# Patient Record
Sex: FEMALE | Race: WHITE | URBAN - METROPOLITAN AREA
[De-identification: names, ages, dates, MRNs, and addresses within clinical notes are randomized per-mention and may not be internally consistent; named-entity substitution may affect disease eponyms.]

---

## 2017-01-09 ENCOUNTER — OUTPATIENT (OUTPATIENT)
Dept: OUTPATIENT SERVICES | Facility: HOSPITAL | Age: 82
LOS: 1 days | End: 2017-01-09

## 2017-01-09 ENCOUNTER — APPOINTMENT (OUTPATIENT)
Dept: ULTRASOUND IMAGING | Facility: HOSPITAL | Age: 82
End: 2017-01-09

## 2017-01-09 DIAGNOSIS — R74.0 NONSPECIFIC ELEVATION OF LEVELS OF TRANSAMINASE AND LACTIC ACID DEHYDROGENASE [LDH]: ICD-10-CM

## 2018-04-03 ENCOUNTER — INPATIENT (INPATIENT)
Facility: HOSPITAL | Age: 83
LOS: 0 days | DRG: 871 | End: 2018-04-04
Attending: SPECIALIST | Admitting: SPECIALIST
Payer: MEDICARE

## 2018-04-03 VITALS
OXYGEN SATURATION: 95 % | WEIGHT: 179.9 LBS | DIASTOLIC BLOOD PRESSURE: 82 MMHG | HEART RATE: 80 BPM | SYSTOLIC BLOOD PRESSURE: 132 MMHG | RESPIRATION RATE: 18 BRPM

## 2018-04-03 DIAGNOSIS — I48.91 UNSPECIFIED ATRIAL FIBRILLATION: ICD-10-CM

## 2018-04-03 LAB
BASOPHILS # BLD AUTO: 0 K/UL — SIGNIFICANT CHANGE UP (ref 0–0.2)
BASOPHILS NFR BLD AUTO: 0.1 % — SIGNIFICANT CHANGE UP (ref 0–2)
CK MB BLD-MCNC: 2.5 % — SIGNIFICANT CHANGE UP (ref 0–3.5)
CK MB CFR SERPL CALC: 4.9 NG/ML — HIGH (ref 0–3.8)
EOSINOPHIL # BLD AUTO: 0 K/UL — SIGNIFICANT CHANGE UP (ref 0–0.5)
EOSINOPHIL NFR BLD AUTO: 0 % — SIGNIFICANT CHANGE UP (ref 0–6)
GAS PNL BLDV: SIGNIFICANT CHANGE UP
GAS PNL BLDV: SIGNIFICANT CHANGE UP
HCT VFR BLD CALC: 47.9 % — HIGH (ref 34.5–45)
HGB BLD-MCNC: 15.7 G/DL — HIGH (ref 11.5–15.5)
LYMPHOCYTES # BLD AUTO: 0.8 K/UL — LOW (ref 1–3.3)
LYMPHOCYTES # BLD AUTO: 5.9 % — LOW (ref 13–44)
MCHC RBC-ENTMCNC: 32.4 PG — SIGNIFICANT CHANGE UP (ref 27–34)
MCHC RBC-ENTMCNC: 32.7 GM/DL — SIGNIFICANT CHANGE UP (ref 32–36)
MCV RBC AUTO: 99.2 FL — SIGNIFICANT CHANGE UP (ref 80–100)
MONOCYTES # BLD AUTO: 0.7 K/UL — SIGNIFICANT CHANGE UP (ref 0–0.9)
MONOCYTES NFR BLD AUTO: 5.1 % — SIGNIFICANT CHANGE UP (ref 2–14)
NEUTROPHILS # BLD AUTO: 11.8 K/UL — HIGH (ref 1.8–7.4)
NEUTROPHILS NFR BLD AUTO: 88.9 % — HIGH (ref 43–77)
NT-PROBNP SERPL-SCNC: HIGH PG/ML (ref 0–300)
PLATELET # BLD AUTO: 261 K/UL — SIGNIFICANT CHANGE UP (ref 150–400)
RBC # BLD: 4.83 M/UL — SIGNIFICANT CHANGE UP (ref 3.8–5.2)
RBC # FLD: 14.9 % — HIGH (ref 10.3–14.5)
TROPONIN T SERPL-MCNC: 0.04 NG/ML — SIGNIFICANT CHANGE UP (ref 0–0.06)
WBC # BLD: 13.3 K/UL — HIGH (ref 3.8–10.5)
WBC # FLD AUTO: 13.3 K/UL — HIGH (ref 3.8–10.5)

## 2018-04-03 PROCEDURE — 99291 CRITICAL CARE FIRST HOUR: CPT

## 2018-04-03 PROCEDURE — 93010 ELECTROCARDIOGRAM REPORT: CPT

## 2018-04-03 PROCEDURE — 71045 X-RAY EXAM CHEST 1 VIEW: CPT | Mod: 26

## 2018-04-03 PROCEDURE — 99233 SBSQ HOSP IP/OBS HIGH 50: CPT | Mod: GC

## 2018-04-03 RX ORDER — SODIUM CHLORIDE 9 MG/ML
1000 INJECTION, SOLUTION INTRAVENOUS ONCE
Qty: 0 | Refills: 0 | Status: COMPLETED | OUTPATIENT
Start: 2018-04-03 | End: 2018-04-03

## 2018-04-03 RX ORDER — VANCOMYCIN HCL 1 G
1000 VIAL (EA) INTRAVENOUS ONCE
Qty: 0 | Refills: 0 | Status: COMPLETED | OUTPATIENT
Start: 2018-04-03 | End: 2018-04-03

## 2018-04-03 RX ORDER — PIPERACILLIN AND TAZOBACTAM 4; .5 G/20ML; G/20ML
3.38 INJECTION, POWDER, LYOPHILIZED, FOR SOLUTION INTRAVENOUS ONCE
Qty: 0 | Refills: 0 | Status: COMPLETED | OUTPATIENT
Start: 2018-04-03 | End: 2018-04-03

## 2018-04-03 RX ORDER — AMIODARONE HYDROCHLORIDE 400 MG/1
150 TABLET ORAL ONCE
Qty: 0 | Refills: 0 | Status: COMPLETED | OUTPATIENT
Start: 2018-04-03 | End: 2018-04-03

## 2018-04-03 RX ORDER — AMIODARONE HYDROCHLORIDE 400 MG/1
1 TABLET ORAL
Qty: 900 | Refills: 0 | Status: DISCONTINUED | OUTPATIENT
Start: 2018-04-03 | End: 2018-04-04

## 2018-04-03 RX ORDER — PHENYLEPHRINE HYDROCHLORIDE 10 MG/ML
1 INJECTION INTRAVENOUS
Qty: 80 | Refills: 0 | Status: DISCONTINUED | OUTPATIENT
Start: 2018-04-03 | End: 2018-04-04

## 2018-04-03 RX ORDER — SODIUM CHLORIDE 9 MG/ML
500 INJECTION, SOLUTION INTRAVENOUS ONCE
Qty: 0 | Refills: 0 | Status: COMPLETED | OUTPATIENT
Start: 2018-04-03 | End: 2018-04-03

## 2018-04-03 RX ADMIN — Medication 250 MILLIGRAM(S): at 22:30

## 2018-04-03 RX ADMIN — PIPERACILLIN AND TAZOBACTAM 200 GRAM(S): 4; .5 INJECTION, POWDER, LYOPHILIZED, FOR SOLUTION INTRAVENOUS at 21:30

## 2018-04-03 RX ADMIN — SODIUM CHLORIDE 2000 MILLILITER(S): 9 INJECTION, SOLUTION INTRAVENOUS at 19:45

## 2018-04-03 RX ADMIN — AMIODARONE HYDROCHLORIDE 33.33 MG/MIN: 400 TABLET ORAL at 22:14

## 2018-04-03 RX ADMIN — SODIUM CHLORIDE 1000 MILLILITER(S): 9 INJECTION, SOLUTION INTRAVENOUS at 20:45

## 2018-04-03 RX ADMIN — SODIUM CHLORIDE 4000 MILLILITER(S): 9 INJECTION, SOLUTION INTRAVENOUS at 19:45

## 2018-04-03 RX ADMIN — AMIODARONE HYDROCHLORIDE 600 MILLIGRAM(S): 400 TABLET ORAL at 21:20

## 2018-04-03 NOTE — ED PROVIDER NOTE - PMH
Cardiac disease    DM (diabetes mellitus)    HTN (hypertension)    PVC (premature ventricular contraction)

## 2018-04-03 NOTE — ED ADULT NURSE REASSESSMENT NOTE - NS ED NURSE REASSESS COMMENT FT1
Attempted to place hooker catheter, patient able to tolerate, and started to desaturate with positioning. MD made aware. MICU RN made aware Attempted to place hooker catheter, patient able to tolerate, and started to desaturate with positioning. MD made aware. MICU RN made aware. Awaiting phenylephrine infusion from pharmacy

## 2018-04-03 NOTE — ED PROVIDER NOTE - ATTENDING CONTRIBUTION TO CARE
Attending MD Bruce:  I personally have seen and examined this patient.  Resident note reviewed and agree on plan of care and except where noted.  See HPI, PE, and MDM for details.     Attending MD Bruce: A & O x 3, NAD, EOMI b/l, PERRL b/l; lungs CTAB, heart irreg rhythm without murmur; abdomen soft NTND; +extensive intertrigo of lower abdomen and b/l inguinal creases, extremities with no edema; affect appropriate. neuro exam non focal with no motor or sensory deficits.       87F presenting from home with report of generalized fatigue, vitals notable for rapid afib to 170s, relative hypotension to 80s-90s, pt mentating well at this time so will address with IV fluid resuscitation, infectious/metabolic work up. Rectal temp to r/o evolving sepsis. Attending MD Bruce:  I personally have seen and examined this patient.  Resident note reviewed and agree on plan of care and except where noted.  See HPI, PE, and MDM for details.     Attending MD Bruce: A & O x 3, NAD, EOMI b/l, PERRL b/l; lungs CTAB, heart irreg rhythm without murmur; abdomen soft NTND; +extensive intertrigo of lower abdomen and b/l inguinal creases, extremities with no edema; extremities cool, +distal mottling, affect appropriate. neuro exam non focal with no motor or sensory deficits.       87F presenting from home with report of generalized fatigue, vitals notable for rapid afib to 170s, relative hypotension to 80s-90s, pt mentating well at this time so will address with IV fluid resuscitation, infectious/metabolic work up. Rectal temp to r/o evolving sepsis.

## 2018-04-03 NOTE — ED PROVIDER NOTE - CARE PLAN
Principal Discharge DX:	Rapid atrial fibrillation  Secondary Diagnosis:	Lactic acidosis Principal Discharge DX:	Rapid atrial fibrillation  Secondary Diagnosis:	Lactic acidosis  Secondary Diagnosis:	Shock

## 2018-04-03 NOTE — ED PROVIDER NOTE - OBJECTIVE STATEMENT
87F with PMH of HTN, DM, frequent PVCs, presenting with increased weakness, decreased appetite x4 days. As per family bedside, patient was found on Saturday with most of her body slid off of her recliner chair for an unknown period of time. Patient states that she was napping and slid off the chair. Patient has been unable to pick herself up out of bed and has been walking less due to feeling weak. States she walks with a walker at baseline. Denies fever, chills, cp, sob, n/v/d, dizziness, headache, recent illness.  PMD: Dr. Brando Hutson

## 2018-04-03 NOTE — ED ADULT NURSE REASSESSMENT NOTE - NS ED NURSE REASSESS COMMENT FT1
Received report from [place] [facility]MS in [facility]. Patient moved to critical room C. Patient A&Ox3, Received report from [place] [facility]MS in [facility]. Patient moved to critical room C. Patient A&Ox3 with family at bedside. Patient tachycardic to 150s, denies palpitations or CP. Denies SOB, on 4L NC.  Patient reporting chronic lower back pain - repositioned in bed in position of comfort. Amiodarone infusing as ordered. Patient receiving vancomycin and 500cc LR bolus. B/L LE 3+ pitting edema noted, pulses strong and regular. Skin cool and mottled peripherally with dusky toes and fingertips. Erythema and excoriation noted to groin and rectum.  Md Bruce at bedside for evaluation

## 2018-04-03 NOTE — ED PROVIDER NOTE - PROGRESS NOTE DETAILS
Attending MD Bruce: IV fluids infusing, lactate elevated at 8, ddx includes sepsis or EUGENIA. Will continue the IV fluids, awaiting CMP, will need MICU consult Attending MD Bruce: SBP in 80s, will not pursue any further IV fluids as she has already had an adequate fluid challenge. Will continue amio and start peripheral ran to support BP for now in hopes that amio will lead to improved HR or cardioversion Attending MD Bruce: SBP in 80s, will not pursue any further IV fluids as she has already had an adequate fluid challenge. Will continue amio and start peripheral ran to support BP for now in hopes that amio will lead to improved HR or cardioversion. Patient endorsed to Dr. San of ICU, patient care to be assumed by this practitioner at this time.

## 2018-04-03 NOTE — CHART NOTE - NSCHARTNOTEFT_GEN_A_CORE
CHIEF COMPLAINT: weakness     HPI:  87F h/o HTN, T2DM, frequent PVCs, presenting with increased weakness, decreased appetite x4 days. Patient has been unable to pick herself up out of bed and has been walking less due to feeling weak. States she walks with a walker at baseline. Denies fever, chills, cp, sob, cough, n/v/d, dizziness, headache, dysuria, sick contacts, recent travel. Per family, patient has started to skip meals and require more supervision at home. However, has remained AAOx3. No new neuro defecits, change in stength/sensation.    PMD: Dr. Brando Hutson    PAST MEDICAL & SURGICAL HISTORY:  DM (diabetes mellitus)  PVC (premature ventricular contraction)  HTN (hypertension)  Cardiac disease  -Kidney stone removal (lithotripsy)      FAMILY HISTORY: non-contributory     Social: no IVDU, tobacco, or ETOH    Allergies: NKDA    HOME MEDICATIONS:  Januvia 100 mgx1  metformin 2000 mg at dinner   duloexitine 60 mg x1   klor-con 10 meqx1  olmesartan-hctz 40/25   atorvastatin 40     HOSPITAL MEDICATIONS:    piperacillin/tazobactam IVPB. 3.375 Gram(s) IV Intermittent once  vancomycin  IVPB 1000 milliGRAM(s) IV Intermittent once    amiodarone Infusion 1 mG/Min IV Continuous <Continuous>    REVIEW OF SYSTEMS:  Constitutional: [ ] negative [ ] fevers [ ] chills [ ] weight loss [ ] weight gain [x] weakness  HEENT: [x ] negative [ ] dry eyes [ ] eye irritation [ ] postnasal drip [ ] nasal congestion  CV: [x ] negative  [ ] chest pain [ ] orthopnea [ ] palpitations [ ] murmur  Resp: [x ] negative [ ] cough [ ] shortness of breath [ ] dyspnea [ ] wheezing [ ] sputum [ ] hemoptysis  GI: [x ] negative [ ] nausea [ ] vomiting [ ] diarrhea [ ] constipation [ ] abd pain [ ] dysphagia   : [x ] negative [ ] dysuria [ ] nocturia [ ] hematuria [ ] increased urinary frequency  Musculoskeletal: [x ] negative [ ] back pain [ ] myalgias [ ] arthralgias [ ] fracture  Skin: [x ] negative [ ] rash [ ] itch  Neurological: [x ] negative [ ] headache [ ] dizziness [ ] syncope [ ] weakness [ ] numbness  Psychiatric: [ ] negative [ ] anxiety [x ] depression  [ ] All other systems negative  [ ] Unable to assess ROS because ________    OBJECTIVE:  ICU Vital Signs Last 24 Hrs  T(C): 36.3 (03 Apr 2018 20:10), Max: 36.6 (03 Apr 2018 18:57)  T(F): 97.3 (03 Apr 2018 20:10), Max: 97.8 (03 Apr 2018 18:57)  HR: 154 (03 Apr 2018 21:03) (80 - 156)  BP: 111/67 (03 Apr 2018 21:03) (98/73 - 132/82)  BP(mean): --  ABP: --  ABP(mean): --  RR: 24 (03 Apr 2018 21:03) (18 - 24)  SpO2: 99% (03 Apr 2018 21:03) (95% - 99%)      PHYSICAL EXAM:  General: NAD, AAOx3  HEENT: MMM, EOMI, PERRL, sclera anicteric   Neck: supple  Respiratory: dec lung sounds on right   Cardiovascular:  irregularly irregular   Abdomen: soft, NT/ND,   Extremities:  +2 LE edema b/l   Skin: b/l skin erythema inner thigh skin folds   Neurological: non-focal  Psychiatry: nl mood and affect      LABS:                        15.7   13.3  )-----------( 261      ( 03 Apr 2018 20:11 )             47.9     Hgb Trend: 15.7<--  04-03    141  |  95<L>  |  68<H>  ----------------------------<  109<H>  4.3   |  19<L>  |  1.62<H>    Ca    9.9      03 Apr 2018 20:20    TPro  8.5<H>  /  Alb  4.0  /  TBili  1.3<H>  /  DBili  x   /  AST  40  /  ALT  28  /  AlkPhos  176<H>  04-03    Creatinine Trend: 1.62<--        Venous Blood Gas:  04-03 @ 20:11  7.23/53/24/21/22  VBG Lactate: 8.5      MICROBIOLOGY: blood cultures sent, UA pending     RADIOLOGY: CXR with RLL effusion     EKG: afib w/ rvr CHIEF COMPLAINT: weakness     HPI:  87F h/o HTN, T2DM, frequent PVCs, presenting with increased weakness, decreased appetite x4 days. Patient has been unable to pick herself up out of bed and has been walking less due to feeling weak. States she walks with a walker at baseline. Denies fever, chills, cp, sob, cough, n/v/d, dizziness, headache, dysuria, sick contacts, recent travel. Per family, patient has started to skip meals and require more supervision at home. However, has remained AAOx3. No new neuro defecits, change in stength/sensation.    Vital Signs Last 24 Hrs  T(C): 36.3 (04-03-18 @ 20:10), Max: 36.6 (04-03-18 @ 18:57)  T(F): 97.3 (04-03-18 @ 20:10), Max: 97.8 (04-03-18 @ 18:57)  HR: 154 (04-03-18 @ 21:03) (80 - 156)  BP: 111/67 (04-03-18 @ 21:03) (98/73 - 132/82)  BP(mean): --  RR: 24 (04-03-18 @ 21:03) (18 - 24)  SpO2: 99% (04-03-18 @ 21:03) (95% - 99%)    In ED, given 2.5 L LR. Started amio load.    PMD: Dr. Brando Hutson    PAST MEDICAL & SURGICAL HISTORY:  DM (diabetes mellitus)  PVC (premature ventricular contraction)  HTN (hypertension)  Cardiac disease  -Kidney stone removal (lithotripsy)      FAMILY HISTORY: non-contributory     Social: no IVDU, tobacco, or ETOH    Allergies: NKDA    HOME MEDICATIONS:  Januvia 100 mgx1  metformin 2000 mg at dinner   duloexitine 60 mg x1   klor-con 10 meqx1  olmesartan-hctz 40/25   atorvastatin 40     HOSPITAL MEDICATIONS:    piperacillin/tazobactam IVPB. 3.375 Gram(s) IV Intermittent once  vancomycin  IVPB 1000 milliGRAM(s) IV Intermittent once    amiodarone Infusion 1 mG/Min IV Continuous <Continuous>    REVIEW OF SYSTEMS:  Constitutional: [ ] negative [ ] fevers [ ] chills [ ] weight loss [ ] weight gain [x] weakness  HEENT: [x ] negative [ ] dry eyes [ ] eye irritation [ ] postnasal drip [ ] nasal congestion  CV: [x ] negative  [ ] chest pain [ ] orthopnea [ ] palpitations [ ] murmur  Resp: [x ] negative [ ] cough [ ] shortness of breath [ ] dyspnea [ ] wheezing [ ] sputum [ ] hemoptysis  GI: [x ] negative [ ] nausea [ ] vomiting [ ] diarrhea [ ] constipation [ ] abd pain [ ] dysphagia   : [x ] negative [ ] dysuria [ ] nocturia [ ] hematuria [ ] increased urinary frequency  Musculoskeletal: [x ] negative [ ] back pain [ ] myalgias [ ] arthralgias [ ] fracture  Skin: [x ] negative [ ] rash [ ] itch  Neurological: [x ] negative [ ] headache [ ] dizziness [ ] syncope [ ] weakness [ ] numbness  Psychiatric: [ ] negative [ ] anxiety [x ] depression  [ ] All other systems negative  [ ] Unable to assess ROS because ________    OBJECTIVE:  ICU Vital Signs Last 24 Hrs  T(C): 36.3 (03 Apr 2018 20:10), Max: 36.6 (03 Apr 2018 18:57)  T(F): 97.3 (03 Apr 2018 20:10), Max: 97.8 (03 Apr 2018 18:57)  HR: 154 (03 Apr 2018 21:03) (80 - 156)  BP: 111/67 (03 Apr 2018 21:03) (98/73 - 132/82)  BP(mean): --  ABP: --  ABP(mean): --  RR: 24 (03 Apr 2018 21:03) (18 - 24)  SpO2: 99% (03 Apr 2018 21:03) (95% - 99%)      PHYSICAL EXAM:  General: NAD, AAOx3  HEENT: MMM, EOMI, PERRL, sclera anicteric   Neck: supple  Respiratory: dec lung sounds on right   Cardiovascular:  irregularly irregular   Abdomen: soft, NT/ND,   Extremities:  +2 LE edema b/l   Skin: b/l skin erythema inner thigh skin folds   Neurological: non-focal  Psychiatry: nl mood and affect      LABS:                        15.7   13.3  )-----------( 261      ( 03 Apr 2018 20:11 )             47.9     Hgb Trend: 15.7<--  04-03    141  |  95<L>  |  68<H>  ----------------------------<  109<H>  4.3   |  19<L>  |  1.62<H>    Ca    9.9      03 Apr 2018 20:20    TPro  8.5<H>  /  Alb  4.0  /  TBili  1.3<H>  /  DBili  x   /  AST  40  /  ALT  28  /  AlkPhos  176<H>  04-03    Creatinine Trend: 1.62<--        Venous Blood Gas:  04-03 @ 20:11  7.23/53/24/21/22  VBG Lactate: 8.5    BNP-[id number]       MICROBIOLOGY: blood cultures sent, UA pending     RADIOLOGY: CXR with RLL effusion     EKG: afib w/ rvr    87F h/o HTN, T2DM, frequent PVCs, presenting with increased weakness, decreased appetite x4 days found to be in afib with rvr in setting of sepsis 2/2 possible RLL pna vs inner thigh cellulitis with UA pending:     #sepsis- hemodynamcially stable   -pan-culture- check RVP, UA/urine culture, f/u blood culture   -c/w vanc, zosyn     #afib w/ rvr likely 2/2 underlying infection   - c/w amio load, cards c/s (in setting of h/o PVCs), check TTE, TFTs, monitor on tele      #metabolic anion gap acidosis   -s/p 2.5L LR, c/w LR IVF resucitation   -repeat lactate and trend     #DEANNA w/ unknown baseline renal function   -likely prerenal in setting dec PO intake, vs ATN in setting of sepsis   -c/w IVF resuscitation   -trend Cr, renally dose meds     #T2DM   -SSI and hold metformin     #HTN  -hold meds in setting of sepsis     #dispo   -admit to [facility], GOC held with patient and family. Patient is DNR/DNI CHIEF COMPLAINT: weakness     HPI:  87F h/o HTN, T2DM, frequent PVCs, presenting with increased weakness, decreased appetite x4 days. Patient has been unable to pick herself up out of bed and has been walking less due to feeling weak. States she walks with a walker at baseline. Denies fever, chills, cp, sob, cough, n/v/d, dizziness, headache, dysuria, sick contacts, recent travel. Per family, patient has started to skip meals and require more supervision at home. However, has remained AAOx3. No new neuro defecits, change in stength/sensation.    Vital Signs Last 24 Hrs  T(C): 36.3 (04-03-18 @ 20:10), Max: 36.6 (04-03-18 @ 18:57)  T(F): 97.3 (04-03-18 @ 20:10), Max: 97.8 (04-03-18 @ 18:57)  HR: 154 (04-03-18 @ 21:03) (80 - 156)  BP: 111/67 (04-03-18 @ 21:03) (98/73 - 132/82)  BP(mean): --  RR: 24 (04-03-18 @ 21:03) (18 - 24)  SpO2: 99% (04-03-18 @ 21:03) (95% - 99%)    In ED, given 2.5 L LR. Started amio load.    PMD: Dr. Brando Hutson    PAST MEDICAL & SURGICAL HISTORY:  DM (diabetes mellitus)  PVC (premature ventricular contraction)  HTN (hypertension)  Cardiac disease  -Kidney stone removal (lithotripsy)      FAMILY HISTORY: non-contributory     Social: no IVDU, tobacco, or ETOH    Allergies: NKDA    HOME MEDICATIONS:  Januvia 100 mgx1  metformin 2000 mg at dinner   duloexitine 60 mg x1   klor-con 10 meqx1  olmesartan-hctz 40/25   atorvastatin 40     HOSPITAL MEDICATIONS:    piperacillin/tazobactam IVPB. 3.375 Gram(s) IV Intermittent once  vancomycin  IVPB 1000 milliGRAM(s) IV Intermittent once    amiodarone Infusion 1 mG/Min IV Continuous <Continuous>    REVIEW OF SYSTEMS:  Constitutional: [ ] negative [ ] fevers [ ] chills [ ] weight loss [ ] weight gain [x] weakness  HEENT: [x ] negative [ ] dry eyes [ ] eye irritation [ ] postnasal drip [ ] nasal congestion  CV: [x ] negative  [ ] chest pain [ ] orthopnea [ ] palpitations [ ] murmur  Resp: [x ] negative [ ] cough [ ] shortness of breath [ ] dyspnea [ ] wheezing [ ] sputum [ ] hemoptysis  GI: [x ] negative [ ] nausea [ ] vomiting [ ] diarrhea [ ] constipation [ ] abd pain [ ] dysphagia   : [x ] negative [ ] dysuria [ ] nocturia [ ] hematuria [ ] increased urinary frequency  Musculoskeletal: [x ] negative [ ] back pain [ ] myalgias [ ] arthralgias [ ] fracture  Skin: [x ] negative [ ] rash [ ] itch  Neurological: [x ] negative [ ] headache [ ] dizziness [ ] syncope [ ] weakness [ ] numbness  Psychiatric: [ ] negative [ ] anxiety [x ] depression  [ ] All other systems negative  [ ] Unable to assess ROS because ________    OBJECTIVE:  ICU Vital Signs Last 24 Hrs  T(C): 36.3 (03 Apr 2018 20:10), Max: 36.6 (03 Apr 2018 18:57)  T(F): 97.3 (03 Apr 2018 20:10), Max: 97.8 (03 Apr 2018 18:57)  HR: 154 (03 Apr 2018 21:03) (80 - 156)  BP: 111/67 (03 Apr 2018 21:03) (98/73 - 132/82)  BP(mean): --  ABP: --  ABP(mean): --  RR: 24 (03 Apr 2018 21:03) (18 - 24)  SpO2: 99% (03 Apr 2018 21:03) (95% - 99%)      PHYSICAL EXAM:  General: NAD, AAOx3  HEENT: MMM, EOMI, PERRL, sclera anicteric   Neck: supple  Respiratory: dec lung sounds on right   Cardiovascular:  irregularly irregular   Abdomen: soft, NT/ND,   Extremities:  +2 LE edema b/l   Skin: b/l skin erythema inner thigh skin folds   Neurological: non-focal  Psychiatry: nl mood and affect      LABS:                        15.7   13.3  )-----------( 261      ( 03 Apr 2018 20:11 )             47.9     Hgb Trend: 15.7<--  04-03    141  |  95<L>  |  68<H>  ----------------------------<  109<H>  4.3   |  19<L>  |  1.62<H>    Ca    9.9      03 Apr 2018 20:20    TPro  8.5<H>  /  Alb  4.0  /  TBili  1.3<H>  /  DBili  x   /  AST  40  /  ALT  28  /  AlkPhos  176<H>  04-03    Creatinine Trend: 1.62<--        Venous Blood Gas:  04-03 @ 20:11  7.23/53/24/21/22  VBG Lactate: 8.5    BNP-24392       MICROBIOLOGY: blood cultures sent, UA pending     RADIOLOGY: CXR with RLL effusion     EKG: afib w/ rvr    87F h/o HTN, T2DM, frequent PVCs, presenting with increased weakness, decreased appetite x4 days found to be in afib with rvr in setting of sepsis 2/2 possible RLL pna vs inner thigh cellulitis with UA pending:     #sepsis- hemodynamcially stable   -pan-culture- check RVP, UA/urine culture, f/u blood culture   -c/w vanc, zosyn     #afib w/ rvr likely 2/2 underlying infection   - c/w amio load, cards c/s (in setting of h/o PVCs), check TTE, TFTs, monitor on tele      #metabolic anion gap acidosis   -s/p 2.5L LR, c/w LR IVF resucitation   -repeat lactate and trend     #DEANNA w/ unknown baseline renal function   -likely prerenal in setting dec PO intake, vs ATN in setting of sepsis   -c/w IVF resuscitation   -trend Cr, renally dose meds     #T2DM   -SSI and hold metformin     #HTN  -hold meds in setting of sepsis     #elevated alk phos, total bili with no RUQ abn pain   -check RUQ US, trend labs     #dispo   -admit to [facility], GOC held with patient and family. Patient is DNR/DNI CHIEF COMPLAINT: weakness     HPI:  87F h/o HTN, T2DM, frequent PVCs, presenting with increased weakness, decreased appetite x4 days. Patient has been unable to pick herself up out of bed and has been walking less due to feeling weak. States she walks with a walker at baseline. Denies fever, chills, cp, sob, cough, n/v/d, dizziness, headache, dysuria, sick contacts, recent travel. No falls, LOC. Per family, patient has started to skip meals and require more supervision at home. However, has remained AAOx3. No new neuro defecits, change in stength/sensation.    Vital Signs Last 24 Hrs  T(C): 36.3 (04-03-18 @ 20:10), Max: 36.6 (04-03-18 @ 18:57)  T(F): 97.3 (04-03-18 @ 20:10), Max: 97.8 (04-03-18 @ 18:57)  HR: 154 (04-03-18 @ 21:03) (80 - 156)  BP: 111/67 (04-03-18 @ 21:03) (98/73 - 132/82)  BP(mean): --  RR: 24 (04-03-18 @ 21:03) (18 - 24)  SpO2: 99% (04-03-18 @ 21:03) (95% - 99%)    In ED, given 2.5 L LR, vanc, zosyn and started amio load.    PMD: Dr. Brando Hutson    PAST MEDICAL & SURGICAL HISTORY:  DM (diabetes mellitus)  PVC (premature ventricular contraction)  HTN (hypertension)  Cardiac disease  -Kidney stone removal (lithotripsy)      FAMILY HISTORY: non-contributory     Social: no IVDU, tobacco, or ETOH    Allergies: NKDA    HOME MEDICATIONS:  Januvia 100 mgx1  metformin 2000 mg at dinner   duloexitine 60 mg x1   klor-con 10 meqx1  olmesartan-hctz 40/25   atorvastatin 40     HOSPITAL MEDICATIONS:    piperacillin/tazobactam IVPB. 3.375 Gram(s) IV Intermittent once  vancomycin  IVPB 1000 milliGRAM(s) IV Intermittent once    amiodarone Infusion 1 mG/Min IV Continuous <Continuous>    REVIEW OF SYSTEMS:  Constitutional: [ ] negative [ ] fevers [ ] chills [ ] weight loss [ ] weight gain [x] weakness  HEENT: [x ] negative [ ] dry eyes [ ] eye irritation [ ] postnasal drip [ ] nasal congestion  CV: [x ] negative  [ ] chest pain [ ] orthopnea [ ] palpitations [ ] murmur  Resp: [x ] negative [ ] cough [ ] shortness of breath [ ] dyspnea [ ] wheezing [ ] sputum [ ] hemoptysis  GI: [x ] negative [ ] nausea [ ] vomiting [ ] diarrhea [ ] constipation [ ] abd pain [ ] dysphagia   : [x ] negative [ ] dysuria [ ] nocturia [ ] hematuria [ ] increased urinary frequency  Musculoskeletal: [x ] negative [ ] back pain [ ] myalgias [ ] arthralgias [ ] fracture  Skin: [x ] negative [ ] rash [ ] itch  Neurological: [x ] negative [ ] headache [ ] dizziness [ ] syncope [ ] weakness [ ] numbness  Psychiatric: [ ] negative [ ] anxiety [x ] depression  [ ] All other systems negative  [ ] Unable to assess ROS because ________    OBJECTIVE:  ICU Vital Signs Last 24 Hrs  T(C): 36.3 (03 Apr 2018 20:10), Max: 36.6 (03 Apr 2018 18:57)  T(F): 97.3 (03 Apr 2018 20:10), Max: 97.8 (03 Apr 2018 18:57)  HR: 154 (03 Apr 2018 21:03) (80 - 156)  BP: 111/67 (03 Apr 2018 21:03) (98/73 - 132/82)  BP(mean): --  ABP: --  ABP(mean): --  RR: 24 (03 Apr 2018 21:03) (18 - 24)  SpO2: 99% (03 Apr 2018 21:03) (95% - 99%)      PHYSICAL EXAM:  General: NAD, AAOx3  HEENT: MMM, EOMI, PERRL, sclera anicteric   Neck: supple  Respiratory: dec lung sounds on right   Cardiovascular:  irregularly irregular   Abdomen: soft, NT/ND,   Extremities:  +2 LE edema b/l   Skin: b/l skin erythema inner thigh skin folds   Neurological: non-focal  Psychiatry: nl mood and affect      LABS:                        15.7   13.3  )-----------( 261      ( 03 Apr 2018 20:11 )             47.9     Hgb Trend: 15.7<--  04-03    141  |  95<L>  |  68<H>  ----------------------------<  109<H>  4.3   |  19<L>  |  1.62<H>    Ca    9.9      03 Apr 2018 20:20    TPro  8.5<H>  /  Alb  4.0  /  TBili  1.3<H>  /  DBili  x   /  AST  40  /  ALT  28  /  AlkPhos  176<H>  04-03    Creatinine Trend: 1.62<--        Venous Blood Gas:  04-03 @ 20:11  7.23/53/24/21/22  VBG Lactate: 8.5    BNP-[id number]       MICROBIOLOGY: blood cultures sent, UA pending     RADIOLOGY: CXR with RLL effusion     EKG: afib w/ rvr    87F h/o HTN, T2DM, frequent PVCs, presenting with increased weakness, decreased appetite x4 days found to be in afib with rvr in setting of sepsis 2/2 possible RLL pna vs inner thigh cellulitis with UA pending:     #sepsis- hemodynamcially stable   -pan-culture- check RVP, UA/urine culture, f/u blood culture   -c/w vanc, zosyn     #afib w/ rvr likely 2/2 underlying infection   - c/w amio load, cards c/s (in setting of h/o PVCs), check TTE, TFTs, monitor on tele      #metabolic anion gap acidosis   -s/p 2.5L LR, c/w LR IVF resucitation   -repeat lactate and trend     #DEANNA w/ unknown baseline renal function   -likely prerenal in setting dec PO intake, vs ATN in setting of sepsis   -c/w IVF resuscitation   -trend Cr, renally dose meds     #T2DM   -SSI and hold metformin     #HTN  -hold meds in setting of sepsis     #elevated alk phos, total bili with no RUQ abn pain   -check RUQ US, trend labs     #dispo   -admit to [facility], GOC held with patient and family. Patient is DNR/DNI CHIEF COMPLAINT: weakness     HPI:  87F h/o HTN, T2DM, frequent PVCs, presenting with increased weakness, decreased appetite x4 days. Patient has been unable to pick herself up out of bed and has been walking less due to feeling weak. States she walks with a walker at baseline. Denies fever, chills, cp, sob, cough, n/v/d, dizziness, headache, dysuria, sick contacts, recent travel. No falls, LOC. Per family, patient has started to skip meals and require more supervision at home. However, has remained AAOx3. No new neuro defecits, change in stength/sensation.    Vital Signs Last 24 Hrs  T(C): 36.3 (04-03-18 @ 20:10), Max: 36.6 (04-03-18 @ 18:57)  T(F): 97.3 (04-03-18 @ 20:10), Max: 97.8 (04-03-18 @ 18:57)  HR: 154 (04-03-18 @ 21:03) (80 - 156)  BP: 111/67 (04-03-18 @ 21:03) (98/73 - 132/82)  BP(mean): --  RR: 24 (04-03-18 @ 21:03) (18 - 24)  SpO2: 99% (04-03-18 @ 21:03) (95% - 99%)    In ED, given 2.5 L LR, vanc, zosyn and started amio load.    PMD: Dr. Brando Hutson    PAST MEDICAL & SURGICAL HISTORY:  DM (diabetes mellitus)  PVC (premature ventricular contraction)  HTN (hypertension)  Cardiac disease  -Kidney stone removal (lithotripsy)      FAMILY HISTORY: non-contributory     Social: no IVDU, tobacco, or ETOH    Allergies: NKDA    HOME MEDICATIONS:  Januvia 100 mgx1  metformin 2000 mg at dinner   duloexitine 60 mg x1   klor-con 10 meqx1  olmesartan-hctz 40/25   atorvastatin 40     HOSPITAL MEDICATIONS:    piperacillin/tazobactam IVPB. 3.375 Gram(s) IV Intermittent once  vancomycin  IVPB 1000 milliGRAM(s) IV Intermittent once    amiodarone Infusion 1 mG/Min IV Continuous <Continuous>    REVIEW OF SYSTEMS:  Constitutional: [ ] negative [ ] fevers [ ] chills [ ] weight loss [ ] weight gain [x] weakness  HEENT: [x ] negative [ ] dry eyes [ ] eye irritation [ ] postnasal drip [ ] nasal congestion  CV: [x ] negative  [ ] chest pain [ ] orthopnea [ ] palpitations [ ] murmur  Resp: [x ] negative [ ] cough [ ] shortness of breath [ ] dyspnea [ ] wheezing [ ] sputum [ ] hemoptysis  GI: [x ] negative [ ] nausea [ ] vomiting [ ] diarrhea [ ] constipation [ ] abd pain [ ] dysphagia   : [x ] negative [ ] dysuria [ ] nocturia [ ] hematuria [ ] increased urinary frequency  Musculoskeletal: [x ] negative [ ] back pain [ ] myalgias [ ] arthralgias [ ] fracture  Skin: [x ] negative [ ] rash [ ] itch  Neurological: [x ] negative [ ] headache [ ] dizziness [ ] syncope [ ] weakness [ ] numbness  Psychiatric: [ ] negative [ ] anxiety [x ] depression  [ ] All other systems negative  [ ] Unable to assess ROS because ________    OBJECTIVE:  ICU Vital Signs Last 24 Hrs  T(C): 36.3 (03 Apr 2018 20:10), Max: 36.6 (03 Apr 2018 18:57)  T(F): 97.3 (03 Apr 2018 20:10), Max: 97.8 (03 Apr 2018 18:57)  HR: 154 (03 Apr 2018 21:03) (80 - 156)  BP: 111/67 (03 Apr 2018 21:03) (98/73 - 132/82)  BP(mean): --  ABP: --  ABP(mean): --  RR: 24 (03 Apr 2018 21:03) (18 - 24)  SpO2: 99% (03 Apr 2018 21:03) (95% - 99%)      PHYSICAL EXAM:  General: NAD, AAOx3  HEENT: MMM, EOMI, PERRL, sclera anicteric   Neck: supple  Respiratory: dec lung sounds on right   Cardiovascular:  irregularly irregular   Abdomen: soft, NT/ND,   Extremities:  +2 LE edema b/l   Skin: b/l skin erythema inner thigh skin folds   Neurological: non-focal  Psychiatry: nl mood and affect      LABS:                        15.7   13.3  )-----------( 261      ( 03 Apr 2018 20:11 )             47.9     Hgb Trend: 15.7<--  04-03    141  |  95<L>  |  68<H>  ----------------------------<  109<H>  4.3   |  19<L>  |  1.62<H>    Ca    9.9      03 Apr 2018 20:20    TPro  8.5<H>  /  Alb  4.0  /  TBili  1.3<H>  /  DBili  x   /  AST  40  /  ALT  28  /  AlkPhos  176<H>  04-03    Creatinine Trend: 1.62<--        Venous Blood Gas:  04-03 @ 20:11  7.23/53/24/21/22  VBG Lactate: 8.5    BNP-[id number]       MICROBIOLOGY: blood cultures sent, UA pending     RADIOLOGY: CXR with RLL effusion     EKG: afib w/ rvr    87F h/o HTN, T2DM, frequent PVCs, presenting with increased weakness, decreased appetite x4 days found to be in afib with rvr in setting of sepsis 2/2 possible RLL pna vs inner thigh cellulitis with UA pending:     #sepsis- hemodynamcially stable   -pan-culture- check RVP, UA/urine culture, f/u blood culture   -c/w vanc, zosyn   -nystatin powder to b/l inner thighs and wound care consult     #afib w/ rvr likely 2/2 underlying infection   - c/w amio load, cards c/s (in setting of h/o PVCs), check TTE, TFTs, monitor on tele, trend Maude    #metabolic anion gap acidosis   -s/p 2.5L LR, c/w LR IVF resucitation   -repeat lactate and trend     #DEANNA w/ unknown baseline renal function   -likely prerenal in setting dec PO intake, vs ATN in setting of sepsis   -c/w IVF resuscitation   -trend Cr, renally dose meds     #T2DM   -SSI and hold metformin     #HTN  -hold meds in setting of sepsis     #elevated alk phos, total bili with no RUQ abn pain   -check RUQ US, trend labs     #dispo   -admit to [facility], GOC held with patient and family. Patient is DNR/DNI CHIEF COMPLAINT: weakness     HPI:  87F h/o HTN, T2DM, frequent PVCs, presenting with increased weakness, decreased appetite x4 days. Patient has been unable to pick herself up out of bed and has been walking less due to feeling weak. States she walks with a walker at baseline. Denies fever, chills, cp, sob, cough, n/v/d, dizziness, headache, dysuria, sick contacts, recent travel. No falls, LOC. Per family, patient has started to skip meals and require more supervision at home. However, has remained AAOx3. No new neuro defecits, change in stength/sensation.    Vital Signs Last 24 Hrs  T(C): 36.3 (04-03-18 @ 20:10), Max: 36.6 (04-03-18 @ 18:57)  T(F): 97.3 (04-03-18 @ 20:10), Max: 97.8 (04-03-18 @ 18:57)  HR: 154 (04-03-18 @ 21:03) (80 - 156)  BP: 111/67 (04-03-18 @ 21:03) (98/73 - 132/82)  BP(mean): --  RR: 24 (04-03-18 @ 21:03) (18 - 24)  SpO2: 99% (04-03-18 @ 21:03) (95% - 99%)    In ED, given 2.5 L LR, vanc, zosyn and started amio load.    PMD: Dr. Brando Hutson    PAST MEDICAL & SURGICAL HISTORY:  DM (diabetes mellitus)  PVC (premature ventricular contraction)  HTN (hypertension)  Cardiac disease  -Kidney stone removal (lithotripsy)      FAMILY HISTORY: non-contributory     Social: no IVDU, tobacco, or ETOH    Allergies: NKDA    HOME MEDICATIONS:  Januvia 100 mgx1  metformin 2000 mg at dinner   duloexitine 60 mg x1   klor-con 10 meqx1  olmesartan-hctz 40/25   atorvastatin 40     HOSPITAL MEDICATIONS:    piperacillin/tazobactam IVPB. 3.375 Gram(s) IV Intermittent once  vancomycin  IVPB 1000 milliGRAM(s) IV Intermittent once    amiodarone Infusion 1 mG/Min IV Continuous <Continuous>    REVIEW OF SYSTEMS:  Constitutional: [ ] negative [ ] fevers [ ] chills [ ] weight loss [ ] weight gain [x] weakness  HEENT: [x ] negative [ ] dry eyes [ ] eye irritation [ ] postnasal drip [ ] nasal congestion  CV: [x ] negative  [ ] chest pain [ ] orthopnea [ ] palpitations [ ] murmur  Resp: [x ] negative [ ] cough [ ] shortness of breath [ ] dyspnea [ ] wheezing [ ] sputum [ ] hemoptysis  GI: [x ] negative [ ] nausea [ ] vomiting [ ] diarrhea [ ] constipation [ ] abd pain [ ] dysphagia   : [x ] negative [ ] dysuria [ ] nocturia [ ] hematuria [ ] increased urinary frequency  Musculoskeletal: [x ] negative [ ] back pain [ ] myalgias [ ] arthralgias [ ] fracture  Skin: [x ] negative [ ] rash [ ] itch  Neurological: [x ] negative [ ] headache [ ] dizziness [ ] syncope [ ] weakness [ ] numbness  Psychiatric: [ ] negative [ ] anxiety [x ] depression  [ ] All other systems negative  [ ] Unable to assess ROS because ________    OBJECTIVE:  ICU Vital Signs Last 24 Hrs  T(C): 36.3 (03 Apr 2018 20:10), Max: 36.6 (03 Apr 2018 18:57)  T(F): 97.3 (03 Apr 2018 20:10), Max: 97.8 (03 Apr 2018 18:57)  HR: 154 (03 Apr 2018 21:03) (80 - 156)  BP: 111/67 (03 Apr 2018 21:03) (98/73 - 132/82)  BP(mean): --  ABP: --  ABP(mean): --  RR: 24 (03 Apr 2018 21:03) (18 - 24)  SpO2: 99% (03 Apr 2018 21:03) (95% - 99%)      PHYSICAL EXAM:  General: NAD, AAOx3  HEENT: MMM, EOMI, PERRL, sclera anicteric   Neck: supple  Respiratory: dec lung sounds on right   Cardiovascular:  irregularly irregular   Abdomen: soft, NT/ND,   Extremities:  +2 LE edema b/l   Skin: b/l skin erythema inner thigh skin folds   Neurological: non-focal  Psychiatry: nl mood and affect      LABS:                        15.7   13.3  )-----------( 261      ( 03 Apr 2018 20:11 )             47.9     Hgb Trend: 15.7<--  04-03    141  |  95<L>  |  68<H>  ----------------------------<  109<H>  4.3   |  19<L>  |  1.62<H>    Ca    9.9      03 Apr 2018 20:20    TPro  8.5<H>  /  Alb  4.0  /  TBili  1.3<H>  /  DBili  x   /  AST  40  /  ALT  28  /  AlkPhos  176<H>  04-03    Creatinine Trend: 1.62<--        Venous Blood Gas:  04-03 @ 20:11  7.23/53/24/21/22  VBG Lactate: 8.5    BNP-[id number]       MICROBIOLOGY: blood cultures sent, UA pending     RADIOLOGY: CXR with RLL effusion     EKG: afib w/ rvr    87F h/o HTN, T2DM, frequent PVCs, presenting with increased weakness, decreased appetite x4 days found to be in afib with rvr in setting of sepsis 2/2 possible RLL pna vs inner thigh cellulitis with UA pending:     #sepsis- hemodynamcially stable   -pan-culture- check RVP, UA/urine culture, f/u blood culture   -c/w vanc, zosyn   -nystatin powder to b/l inner thighs (intertrigo) and wound care consult     #afib w/ rvr likely 2/2 underlying infection   - c/w amio load, cards c/s (in setting of h/o PVCs), check TTE, TFTs, monitor on tele, trend Maude    #metabolic anion gap acidosis   -s/p 2.5L LR, c/w LR IVF resucitation   -repeat lactate and trend     #DEANNA w/ unknown baseline renal function   -likely prerenal in setting dec PO intake, vs ATN in setting of sepsis   -c/w IVF resuscitation   -trend Cr, renally dose meds     #T2DM   -SSI and hold metformin     #HTN  -hold meds in setting of sepsis     #elevated alk phos, total bili with no RUQ abn pain   -check RUQ US, trend labs     #dispo   -admit to [facility], GOC held with patient and family. Patient is DNR/DNI CHIEF COMPLAINT: weakness     HPI:  87F h/o HTN, T2DM, frequent PVCs, presenting with increased weakness, decreased appetite x4 days. Patient has been unable to pick herself up out of bed and has been walking less due to feeling weak. States she walks with a walker at baseline. Denies fever, chills, cp, sob, cough, n/v/d, dizziness, headache, dysuria, sick contacts, recent travel. No falls, LOC. Per family, patient has started to skip meals and require more supervision at home. However, has remained AAOx3. No new neuro defecits, change in stength/sensation.    Vital Signs Last 24 Hrs  T(C): 36.3 (--18 @ 20:10), Max: 36.6 (--18 @ 18:57)  T(F): 97.3 (--18 @ 20:10), Max: 97.8 (--18 @ 18:57)  HR: 154 (-18 @ 21:03) (80 - 156)  BP: 111/67 (--18 @ 21:03) (98/73 - 132/82)  BP(mean): --  RR: 24 (--18 @ 21:03) (18 - 24)  SpO2: 99% (--18 @ 21:03) (95% - 99%)    In ED, given 2.5 L LR, vanc, zosyn and started amio load.    PMD: Dr. Brando Hutson    PAST MEDICAL & SURGICAL HISTORY:  DM (diabetes mellitus)  PVC (premature ventricular contraction)  HTN (hypertension)  Cardiac disease  -Kidney stone removal (lithotripsy)      FAMILY HISTORY: non-contributory     Social: no IVDU, tobacco, or ETOH    Allergies: NKDA    HOME MEDICATIONS:  Januvia 100 mgx1  metformin 2000 mg at dinner   duloexitine 60 mg x1   klor-con 10 meqx1  olmesartan-hctz 40/25   atorvastatin 40     HOSPITAL MEDICATIONS:    piperacillin/tazobactam IVPB. 3.375 Gram(s) IV Intermittent once  vancomycin  IVPB 1000 milliGRAM(s) IV Intermittent once    amiodarone Infusion 1 mG/Min IV Continuous <Continuous>    REVIEW OF SYSTEMS:  Constitutional: [ ] negative [ ] fevers [ ] chills [ ] weight loss [ ] weight gain [x] weakness  HEENT: [x ] negative [ ] dry eyes [ ] eye irritation [ ] postnasal drip [ ] nasal congestion  CV: [x ] negative  [ ] chest pain [ ] orthopnea [ ] palpitations [ ] murmur  Resp: [x ] negative [ ] cough [ ] shortness of breath [ ] dyspnea [ ] wheezing [ ] sputum [ ] hemoptysis  GI: [x ] negative [ ] nausea [ ] vomiting [ ] diarrhea [ ] constipation [ ] abd pain [ ] dysphagia   : [x ] negative [ ] dysuria [ ] nocturia [ ] hematuria [ ] increased urinary frequency  Musculoskeletal: [x ] negative [ ] back pain [ ] myalgias [ ] arthralgias [ ] fracture  Skin: [x ] negative [ ] rash [ ] itch  Neurological: [x ] negative [ ] headache [ ] dizziness [ ] syncope [ ] weakness [ ] numbness  Psychiatric: [ ] negative [ ] anxiety [x ] depression  [ ] All other systems negative  [ ] Unable to assess ROS because ________    OBJECTIVE:  ICU Vital Signs Last 24 Hrs  T(C): 36.3 (2018 20:10), Max: 36.6 (2018 18:57)  T(F): 97.3 (2018 20:10), Max: 97.8 (2018 18:57)  HR: 154 (2018 21:03) (80 - 156)  BP: 111/67 (2018 21:03) (98/73 - 132/82)  BP(mean): --  ABP: --  ABP(mean): --  RR: 24 (2018 21:03) (18 - 24)  SpO2: 99% (2018 21:03) (95% - 99%)      PHYSICAL EXAM:  General: NAD, AAOx3  HEENT: MMM, EOMI, PERRL, sclera anicteric   Neck: supple  Respiratory: dec lung sounds on right   Cardiovascular:  irregularly irregular   Abdomen: soft, NT/ND,   Extremities:  +2 LE edema b/l   Skin: b/l skin erythema inner thigh skin folds   Neurological: non-focal  Psychiatry: nl mood and affect      LABS:                        15.7   13.3  )-----------( 261      ( 2018 20:11 )             47.9     Hgb Trend: 15.7<--      141  |  95<L>  |  68<H>  ----------------------------<  109<H>  4.3   |  19<L>  |  1.62<H>    Ca    9.9      2018 20:20    TPro  8.5<H>  /  Alb  4.0  /  TBili  1.3<H>  /  DBili  x   /  AST  40  /  ALT  28  /  AlkPhos  176<H>      Creatinine Trend: 1.62<--        Venous Blood Gas:   @ 20:11  7.23/53/24//22  VBG Lactate: 8.5    BNP-[id number]       MICROBIOLOGY: blood cultures sent, UA pending     RADIOLOGY: CXR with RLL effusion     EKG: afib w/ rvr    87F h/o HTN, T2DM, frequent PVCs, presenting with increased weakness, decreased appetite x4 days found to be in afib with rvr in setting of sepsis 2/2 possible RLL pna vs inner thigh cellulitis with UA pending:     #sepsis- hemodynamcially stable   -pan-culture- check RVP, UA/urine culture, f/u blood culture   -c/w vanc, zosyn   -nystatin powder to b/l inner thighs (intertrigo) and wound care consult     #afib w/ rvr likely 2/2 underlying infection   - c/w amio load, cards c/s (in setting of h/o PVCs), check TTE, TFTs, monitor on tele, trend Maude    #metabolic anion gap acidosis   -s/p 2.5L LR, c/w LR IVF resucitation   -repeat lactate and trend     #DEANNA w/ unknown baseline renal function   -likely prerenal in setting dec PO intake, vs ATN in setting of sepsis   -c/w IVF resuscitation   -trend Cr, renally dose meds     #T2DM   -SSI and hold metformin     #HTN  -hold meds in setting of sepsis     #elevated alk phos, total bili with no RUQ abn pain   -check RUQ US, trend labs     #dispo   -admit to [facility], GOC held with patient and family. Patient is DNR/DNI         Critical Care Medicine AttendinF Hx of limited mobility, HTN, DM2 and known PVCs presented with sepsis associated hypotension, Prox A Fib with -160, A Gap metabolic acidosis and Lactic Acidosis, ARF/ DEANNA. She received 2 Li IVF resuscitation and 150 mg IV Amiodarone loaded and Amiodarone gtt protocol with good response.   - Agreeable with IV blous and maintenance IV fluid   - Empiric ABx coverage pending C & S  - Add Diflucan for extensive bilateral intertriginous and perineal candidiasis   - DVT/GI prophylaxis   - Patient is DNR/DNI per family at bedside

## 2018-04-03 NOTE — ED PROVIDER NOTE - SKIN WOUND TYPE
erythematous, foul smelling, moist intertrigo vs cellulitis vs dermatitis involving area under panus and groin, extensive sebhorreic keratotis of back

## 2018-04-03 NOTE — ED PROVIDER NOTE - MEDICAL DECISION MAKING DETAILS
87F presenting with increased weakness for several days with exam significant for extensive skin pathology under pannus and groin region intertrigo

## 2018-04-03 NOTE — ED ADULT NURSE NOTE - OBJECTIVE STATEMENT
pt 86 yo female via ems from home pt had slip off recliner 3 nights ago per daughter pt with increasing weakness poor appetite pt resides inn [place] 2 family house with daughter normally uses walker at home on arrival pt alert verbal oriented x 2 skin cool with dusky feet and finger tips pt denies any complaints b/l clear breath sounds no chest pain pt with legs weakening per family pt 86 yo female via ems from home pt had slip off recliner 3 nights ago per daughter pt with increasing weakness poor appetite pt resides inn h 2 family house with daughter normally uses walker at home on arrival pt alert verbal oriented x 2 skin cool with dusky feet and finger tips pt denies any complaints b/l clear breath sounds no chest pain pt with legs weakening per family cool periphealy pt denies any pain pt with large redness not itchy to skin folds bilaterly inguinally and to rectal area

## 2018-04-04 LAB
ANION GAP SERPL CALC-SCNC: 20 MMOL/L — HIGH (ref 5–17)
APPEARANCE UR: ABNORMAL
APTT BLD: 24.3 SEC — LOW (ref 27.5–37.4)
BACTERIA # UR AUTO: ABNORMAL /HPF
BASE EXCESS BLDV CALC-SCNC: -4.5 MMOL/L — LOW (ref -2–2)
BASOPHILS # BLD AUTO: 0 K/UL — SIGNIFICANT CHANGE UP (ref 0–0.2)
BASOPHILS NFR BLD AUTO: 0.1 % — SIGNIFICANT CHANGE UP (ref 0–2)
BILIRUB UR-MCNC: NEGATIVE — SIGNIFICANT CHANGE UP
BUN SERPL-MCNC: 64 MG/DL — HIGH (ref 7–23)
CALCIUM SERPL-MCNC: 8.6 MG/DL — SIGNIFICANT CHANGE UP (ref 8.4–10.5)
CHLORIDE SERPL-SCNC: 99 MMOL/L — SIGNIFICANT CHANGE UP (ref 96–108)
CK MB BLD-MCNC: 3.4 % — SIGNIFICANT CHANGE UP (ref 0–3.5)
CK MB CFR SERPL CALC: 4.7 NG/ML — HIGH (ref 0–3.8)
CK SERPL-CCNC: 138 U/L — SIGNIFICANT CHANGE UP (ref 25–170)
CO2 BLDV-SCNC: 20 MMOL/L — LOW (ref 22–30)
CO2 SERPL-SCNC: 16 MMOL/L — LOW (ref 22–31)
COLOR SPEC: YELLOW — SIGNIFICANT CHANGE UP
CREAT SERPL-MCNC: 1.5 MG/DL — HIGH (ref 0.5–1.3)
DIFF PNL FLD: NEGATIVE — SIGNIFICANT CHANGE UP
EOSINOPHIL # BLD AUTO: 0 K/UL — SIGNIFICANT CHANGE UP (ref 0–0.5)
EOSINOPHIL NFR BLD AUTO: 0.1 % — SIGNIFICANT CHANGE UP (ref 0–6)
EPI CELLS # UR: SIGNIFICANT CHANGE UP /HPF
GAS PNL BLDV: SIGNIFICANT CHANGE UP
GLUCOSE BLDC GLUCOMTR-MCNC: 137 MG/DL — HIGH (ref 70–99)
GLUCOSE BLDC GLUCOMTR-MCNC: 82 MG/DL — SIGNIFICANT CHANGE UP (ref 70–99)
GLUCOSE SERPL-MCNC: 163 MG/DL — HIGH (ref 70–99)
GLUCOSE UR QL: NEGATIVE — SIGNIFICANT CHANGE UP
HCO3 BLDV-SCNC: 19 MMOL/L — LOW (ref 21–29)
HCT VFR BLD CALC: 41.2 % — SIGNIFICANT CHANGE UP (ref 34.5–45)
HGB BLD-MCNC: 13.2 G/DL — SIGNIFICANT CHANGE UP (ref 11.5–15.5)
HYALINE CASTS # UR AUTO: ABNORMAL
INR BLD: 1.33 RATIO — HIGH (ref 0.88–1.16)
KETONES UR-MCNC: NEGATIVE — SIGNIFICANT CHANGE UP
LEUKOCYTE ESTERASE UR-ACNC: NEGATIVE — SIGNIFICANT CHANGE UP
LYMPHOCYTES # BLD AUTO: 0.7 K/UL — LOW (ref 1–3.3)
LYMPHOCYTES # BLD AUTO: 4.9 % — LOW (ref 13–44)
MAGNESIUM SERPL-MCNC: 2.4 MG/DL — SIGNIFICANT CHANGE UP (ref 1.6–2.6)
MCHC RBC-ENTMCNC: 31.5 PG — SIGNIFICANT CHANGE UP (ref 27–34)
MCHC RBC-ENTMCNC: 32.1 GM/DL — SIGNIFICANT CHANGE UP (ref 32–36)
MCV RBC AUTO: 98 FL — SIGNIFICANT CHANGE UP (ref 80–100)
MONOCYTES # BLD AUTO: 0.8 K/UL — SIGNIFICANT CHANGE UP (ref 0–0.9)
MONOCYTES NFR BLD AUTO: 5.8 % — SIGNIFICANT CHANGE UP (ref 2–14)
NEUTROPHILS # BLD AUTO: 12.6 K/UL — HIGH (ref 1.8–7.4)
NEUTROPHILS NFR BLD AUTO: 89.1 % — HIGH (ref 43–77)
NITRITE UR-MCNC: NEGATIVE — SIGNIFICANT CHANGE UP
PCO2 BLDV: 32 MMHG — LOW (ref 35–50)
PH BLDV: 7.39 — SIGNIFICANT CHANGE UP (ref 7.35–7.45)
PH UR: 6 — SIGNIFICANT CHANGE UP (ref 5–8)
PHOSPHATE SERPL-MCNC: 3.1 MG/DL — SIGNIFICANT CHANGE UP (ref 2.5–4.5)
PLATELET # BLD AUTO: 208 K/UL — SIGNIFICANT CHANGE UP (ref 150–400)
PO2 BLDV: 149 MMHG — HIGH (ref 25–45)
POTASSIUM SERPL-MCNC: 4.2 MMOL/L — SIGNIFICANT CHANGE UP (ref 3.5–5.3)
POTASSIUM SERPL-SCNC: 4.2 MMOL/L — SIGNIFICANT CHANGE UP (ref 3.5–5.3)
PROT UR-MCNC: 100 MG/DL
PROTHROM AB SERPL-ACNC: 14.5 SEC — HIGH (ref 9.8–12.7)
RBC # BLD: 4.21 M/UL — SIGNIFICANT CHANGE UP (ref 3.8–5.2)
RBC # FLD: 14.7 % — HIGH (ref 10.3–14.5)
RBC CASTS # UR COMP ASSIST: SIGNIFICANT CHANGE UP /HPF (ref 0–2)
SAO2 % BLDV: 99 % — HIGH (ref 67–88)
SODIUM SERPL-SCNC: 135 MMOL/L — SIGNIFICANT CHANGE UP (ref 135–145)
SP GR SPEC: 1.02 — SIGNIFICANT CHANGE UP (ref 1.01–1.02)
TROPONIN T SERPL-MCNC: 0.03 NG/ML — SIGNIFICANT CHANGE UP (ref 0–0.06)
TSH SERPL-MCNC: 3.34 UIU/ML — SIGNIFICANT CHANGE UP (ref 0.27–4.2)
UROBILINOGEN FLD QL: NEGATIVE — SIGNIFICANT CHANGE UP
WBC # BLD: 14.1 K/UL — HIGH (ref 3.8–10.5)
WBC # FLD AUTO: 14.1 K/UL — HIGH (ref 3.8–10.5)
WBC UR QL: SIGNIFICANT CHANGE UP /HPF (ref 0–5)

## 2018-04-04 PROCEDURE — 99291 CRITICAL CARE FIRST HOUR: CPT

## 2018-04-04 PROCEDURE — 83735 ASSAY OF MAGNESIUM: CPT

## 2018-04-04 PROCEDURE — 93010 ELECTROCARDIOGRAM REPORT: CPT

## 2018-04-04 PROCEDURE — 82435 ASSAY OF BLOOD CHLORIDE: CPT

## 2018-04-04 PROCEDURE — 85610 PROTHROMBIN TIME: CPT

## 2018-04-04 PROCEDURE — 99291 CRITICAL CARE FIRST HOUR: CPT | Mod: 25

## 2018-04-04 PROCEDURE — 87040 BLOOD CULTURE FOR BACTERIA: CPT

## 2018-04-04 PROCEDURE — 80048 BASIC METABOLIC PNL TOTAL CA: CPT

## 2018-04-04 PROCEDURE — 80053 COMPREHEN METABOLIC PANEL: CPT

## 2018-04-04 PROCEDURE — 83880 ASSAY OF NATRIURETIC PEPTIDE: CPT

## 2018-04-04 PROCEDURE — 82553 CREATINE MB FRACTION: CPT

## 2018-04-04 PROCEDURE — 87086 URINE CULTURE/COLONY COUNT: CPT

## 2018-04-04 PROCEDURE — 71045 X-RAY EXAM CHEST 1 VIEW: CPT

## 2018-04-04 PROCEDURE — 96374 THER/PROPH/DIAG INJ IV PUSH: CPT | Mod: XU

## 2018-04-04 PROCEDURE — 82803 BLOOD GASES ANY COMBINATION: CPT

## 2018-04-04 PROCEDURE — 81001 URINALYSIS AUTO W/SCOPE: CPT

## 2018-04-04 PROCEDURE — 85014 HEMATOCRIT: CPT

## 2018-04-04 PROCEDURE — 82550 ASSAY OF CK (CPK): CPT

## 2018-04-04 PROCEDURE — 84443 ASSAY THYROID STIM HORMONE: CPT

## 2018-04-04 PROCEDURE — 76604 US EXAM CHEST: CPT | Mod: 26,59,GC

## 2018-04-04 PROCEDURE — 85730 THROMBOPLASTIN TIME PARTIAL: CPT

## 2018-04-04 PROCEDURE — 93970 EXTREMITY STUDY: CPT | Mod: 26

## 2018-04-04 PROCEDURE — 85027 COMPLETE CBC AUTOMATED: CPT

## 2018-04-04 PROCEDURE — 83605 ASSAY OF LACTIC ACID: CPT

## 2018-04-04 PROCEDURE — 93970 EXTREMITY STUDY: CPT

## 2018-04-04 PROCEDURE — 96376 TX/PRO/DX INJ SAME DRUG ADON: CPT | Mod: XU

## 2018-04-04 PROCEDURE — 84295 ASSAY OF SERUM SODIUM: CPT

## 2018-04-04 PROCEDURE — 84132 ASSAY OF SERUM POTASSIUM: CPT

## 2018-04-04 PROCEDURE — 96375 TX/PRO/DX INJ NEW DRUG ADDON: CPT | Mod: XU

## 2018-04-04 PROCEDURE — 93005 ELECTROCARDIOGRAM TRACING: CPT

## 2018-04-04 PROCEDURE — 82330 ASSAY OF CALCIUM: CPT

## 2018-04-04 PROCEDURE — 84100 ASSAY OF PHOSPHORUS: CPT

## 2018-04-04 PROCEDURE — 93308 TTE F-UP OR LMTD: CPT | Mod: 26,59,GC

## 2018-04-04 PROCEDURE — 51702 INSERT TEMP BLADDER CATH: CPT

## 2018-04-04 PROCEDURE — 82962 GLUCOSE BLOOD TEST: CPT

## 2018-04-04 PROCEDURE — 94660 CPAP INITIATION&MGMT: CPT

## 2018-04-04 PROCEDURE — 87150 DNA/RNA AMPLIFIED PROBE: CPT

## 2018-04-04 PROCEDURE — 82947 ASSAY GLUCOSE BLOOD QUANT: CPT

## 2018-04-04 PROCEDURE — 84484 ASSAY OF TROPONIN QUANT: CPT

## 2018-04-04 RX ORDER — MORPHINE SULFATE 50 MG/1
2 CAPSULE, EXTENDED RELEASE ORAL ONCE
Qty: 0 | Refills: 0 | Status: DISCONTINUED | OUTPATIENT
Start: 2018-04-04 | End: 2018-04-04

## 2018-04-04 RX ORDER — MORPHINE SULFATE 50 MG/1
1 CAPSULE, EXTENDED RELEASE ORAL
Qty: 0 | Refills: 0 | Status: DISCONTINUED | OUTPATIENT
Start: 2018-04-04 | End: 2018-04-04

## 2018-04-04 RX ORDER — PHENYLEPHRINE HYDROCHLORIDE 10 MG/ML
1 INJECTION INTRAVENOUS
Qty: 80 | Refills: 0 | Status: DISCONTINUED | OUTPATIENT
Start: 2018-04-04 | End: 2018-04-04

## 2018-04-04 RX ORDER — DIGOXIN 250 MCG
0.25 TABLET ORAL ONCE
Qty: 0 | Refills: 0 | Status: COMPLETED | OUTPATIENT
Start: 2018-04-04 | End: 2018-04-04

## 2018-04-04 RX ORDER — METOPROLOL TARTRATE 50 MG
2.5 TABLET ORAL ONCE
Qty: 0 | Refills: 0 | Status: COMPLETED | OUTPATIENT
Start: 2018-04-04 | End: 2018-04-04

## 2018-04-04 RX ORDER — MORPHINE SULFATE 50 MG/1
1 CAPSULE, EXTENDED RELEASE ORAL ONCE
Qty: 0 | Refills: 0 | Status: DISCONTINUED | OUTPATIENT
Start: 2018-04-04 | End: 2018-04-04

## 2018-04-04 RX ORDER — NOREPINEPHRINE BITARTRATE/D5W 8 MG/250ML
0.4 PLASTIC BAG, INJECTION (ML) INTRAVENOUS
Qty: 32 | Refills: 0 | Status: DISCONTINUED | OUTPATIENT
Start: 2018-04-04 | End: 2018-04-04

## 2018-04-04 RX ORDER — INSULIN LISPRO 100/ML
VIAL (ML) SUBCUTANEOUS
Qty: 0 | Refills: 0 | Status: DISCONTINUED | OUTPATIENT
Start: 2018-04-04 | End: 2018-04-04

## 2018-04-04 RX ORDER — VANCOMYCIN HCL 1 G
1250 VIAL (EA) INTRAVENOUS EVERY 12 HOURS
Qty: 0 | Refills: 0 | Status: DISCONTINUED | OUTPATIENT
Start: 2018-04-04 | End: 2018-04-04

## 2018-04-04 RX ORDER — ATORVASTATIN CALCIUM 80 MG/1
1 TABLET, FILM COATED ORAL
Qty: 0 | Refills: 0 | COMMUNITY

## 2018-04-04 RX ORDER — MILRINONE LACTATE 1 MG/ML
0.5 INJECTION, SOLUTION INTRAVENOUS
Qty: 20 | Refills: 0 | Status: DISCONTINUED | OUTPATIENT
Start: 2018-04-04 | End: 2018-04-04

## 2018-04-04 RX ORDER — PIPERACILLIN AND TAZOBACTAM 4; .5 G/20ML; G/20ML
3.38 INJECTION, POWDER, LYOPHILIZED, FOR SOLUTION INTRAVENOUS EVERY 8 HOURS
Qty: 0 | Refills: 0 | Status: DISCONTINUED | OUTPATIENT
Start: 2018-04-04 | End: 2018-04-04

## 2018-04-04 RX ORDER — SITAGLIPTIN 50 MG/1
1 TABLET, FILM COATED ORAL
Qty: 0 | Refills: 0 | COMMUNITY

## 2018-04-04 RX ORDER — DULOXETINE HYDROCHLORIDE 30 MG/1
1 CAPSULE, DELAYED RELEASE ORAL
Qty: 0 | Refills: 0 | COMMUNITY

## 2018-04-04 RX ORDER — MORPHINE SULFATE 50 MG/1
4 CAPSULE, EXTENDED RELEASE ORAL ONCE
Qty: 0 | Refills: 0 | Status: DISCONTINUED | OUTPATIENT
Start: 2018-04-04 | End: 2018-04-04

## 2018-04-04 RX ORDER — HEPARIN SODIUM 5000 [USP'U]/ML
3500 INJECTION INTRAVENOUS; SUBCUTANEOUS EVERY 6 HOURS
Qty: 0 | Refills: 0 | Status: DISCONTINUED | OUTPATIENT
Start: 2018-04-04 | End: 2018-04-04

## 2018-04-04 RX ORDER — NYSTATIN CREAM 100000 [USP'U]/G
1 CREAM TOPICAL
Qty: 0 | Refills: 0 | Status: DISCONTINUED | OUTPATIENT
Start: 2018-04-04 | End: 2018-04-04

## 2018-04-04 RX ORDER — METOPROLOL TARTRATE 50 MG
25 TABLET ORAL ONCE
Qty: 0 | Refills: 0 | Status: COMPLETED | OUTPATIENT
Start: 2018-04-04 | End: 2018-04-04

## 2018-04-04 RX ORDER — MORPHINE SULFATE 50 MG/1
2 CAPSULE, EXTENDED RELEASE ORAL
Qty: 100 | Refills: 0 | Status: DISCONTINUED | OUTPATIENT
Start: 2018-04-04 | End: 2018-04-04

## 2018-04-04 RX ORDER — METFORMIN HYDROCHLORIDE 850 MG/1
2000 TABLET ORAL
Qty: 0 | Refills: 0 | COMMUNITY

## 2018-04-04 RX ORDER — POTASSIUM CHLORIDE 20 MEQ
1 PACKET (EA) ORAL
Qty: 0 | Refills: 0 | COMMUNITY

## 2018-04-04 RX ORDER — OLMESARTAN MEDOXOMIL-HYDROCHLOROTHIAZIDE 25; 40 MG/1; MG/1
1 TABLET, FILM COATED ORAL
Qty: 0 | Refills: 0 | COMMUNITY

## 2018-04-04 RX ORDER — HEPARIN SODIUM 5000 [USP'U]/ML
5000 INJECTION INTRAVENOUS; SUBCUTANEOUS EVERY 8 HOURS
Qty: 0 | Refills: 0 | Status: DISCONTINUED | OUTPATIENT
Start: 2018-04-04 | End: 2018-04-04

## 2018-04-04 RX ORDER — LABETALOL HCL 100 MG
10 TABLET ORAL ONCE
Qty: 0 | Refills: 0 | Status: DISCONTINUED | OUTPATIENT
Start: 2018-04-04 | End: 2018-04-04

## 2018-04-04 RX ORDER — ATORVASTATIN CALCIUM 80 MG/1
40 TABLET, FILM COATED ORAL AT BEDTIME
Qty: 0 | Refills: 0 | Status: DISCONTINUED | OUTPATIENT
Start: 2018-04-04 | End: 2018-04-04

## 2018-04-04 RX ORDER — HEPARIN SODIUM 5000 [USP'U]/ML
7000 INJECTION INTRAVENOUS; SUBCUTANEOUS EVERY 6 HOURS
Qty: 0 | Refills: 0 | Status: DISCONTINUED | OUTPATIENT
Start: 2018-04-04 | End: 2018-04-04

## 2018-04-04 RX ORDER — NOREPINEPHRINE BITARTRATE/D5W 8 MG/250ML
0.4 PLASTIC BAG, INJECTION (ML) INTRAVENOUS
Qty: 8 | Refills: 0 | Status: DISCONTINUED | OUTPATIENT
Start: 2018-04-04 | End: 2018-04-04

## 2018-04-04 RX ORDER — HEPARIN SODIUM 5000 [USP'U]/ML
INJECTION INTRAVENOUS; SUBCUTANEOUS
Qty: 25000 | Refills: 0 | Status: DISCONTINUED | OUTPATIENT
Start: 2018-04-04 | End: 2018-04-04

## 2018-04-04 RX ORDER — SODIUM CHLORIDE 9 MG/ML
500 INJECTION INTRAMUSCULAR; INTRAVENOUS; SUBCUTANEOUS ONCE
Qty: 0 | Refills: 0 | Status: COMPLETED | OUTPATIENT
Start: 2018-04-04 | End: 2018-04-04

## 2018-04-04 RX ORDER — FLUCONAZOLE 150 MG/1
100 TABLET ORAL DAILY
Qty: 0 | Refills: 0 | Status: DISCONTINUED | OUTPATIENT
Start: 2018-04-04 | End: 2018-04-04

## 2018-04-04 RX ADMIN — Medication 25 MILLIGRAM(S): at 04:45

## 2018-04-04 RX ADMIN — MORPHINE SULFATE 1 MILLIGRAM(S): 50 CAPSULE, EXTENDED RELEASE ORAL at 08:30

## 2018-04-04 RX ADMIN — Medication 32.77 MICROGRAM(S)/KG/MIN: at 11:30

## 2018-04-04 RX ADMIN — MORPHINE SULFATE 4 MILLIGRAM(S): 50 CAPSULE, EXTENDED RELEASE ORAL at 16:55

## 2018-04-04 RX ADMIN — MORPHINE SULFATE 1 MILLIGRAM(S): 50 CAPSULE, EXTENDED RELEASE ORAL at 08:45

## 2018-04-04 RX ADMIN — HEPARIN SODIUM 5000 UNIT(S): 5000 INJECTION INTRAVENOUS; SUBCUTANEOUS at 05:51

## 2018-04-04 RX ADMIN — MILRINONE LACTATE 13.11 MICROGRAM(S)/KG/MIN: 1 INJECTION, SOLUTION INTRAVENOUS at 14:00

## 2018-04-04 RX ADMIN — NYSTATIN CREAM 1 APPLICATION(S): 100000 CREAM TOPICAL at 05:51

## 2018-04-04 RX ADMIN — PIPERACILLIN AND TAZOBACTAM 25 GRAM(S): 4; .5 INJECTION, POWDER, LYOPHILIZED, FOR SOLUTION INTRAVENOUS at 14:00

## 2018-04-04 RX ADMIN — SODIUM CHLORIDE 1000 MILLILITER(S): 9 INJECTION INTRAMUSCULAR; INTRAVENOUS; SUBCUTANEOUS at 00:40

## 2018-04-04 RX ADMIN — MORPHINE SULFATE 4 MILLIGRAM(S): 50 CAPSULE, EXTENDED RELEASE ORAL at 17:05

## 2018-04-04 RX ADMIN — MILRINONE LACTATE 13.11 MICROGRAM(S)/KG/MIN: 1 INJECTION, SOLUTION INTRAVENOUS at 08:45

## 2018-04-04 RX ADMIN — MORPHINE SULFATE 1 MILLIGRAM(S): 50 CAPSULE, EXTENDED RELEASE ORAL at 09:00

## 2018-04-04 RX ADMIN — Medication 2.5 MILLIGRAM(S): at 04:01

## 2018-04-04 RX ADMIN — MORPHINE SULFATE 2 MILLIGRAM(S): 50 CAPSULE, EXTENDED RELEASE ORAL at 21:09

## 2018-04-04 RX ADMIN — Medication 2.5 MILLIGRAM(S): at 03:06

## 2018-04-04 RX ADMIN — Medication 2 MILLIGRAM(S): at 23:00

## 2018-04-04 RX ADMIN — Medication 65.55 MICROGRAM(S)/KG/MIN: at 08:45

## 2018-04-04 RX ADMIN — MORPHINE SULFATE 2 MILLIGRAM(S): 50 CAPSULE, EXTENDED RELEASE ORAL at 22:40

## 2018-04-04 RX ADMIN — Medication 0.25 MILLIGRAM(S): at 12:30

## 2018-04-04 RX ADMIN — PIPERACILLIN AND TAZOBACTAM 25 GRAM(S): 4; .5 INJECTION, POWDER, LYOPHILIZED, FOR SOLUTION INTRAVENOUS at 06:16

## 2018-04-04 RX ADMIN — MORPHINE SULFATE 2 MG/HR: 50 CAPSULE, EXTENDED RELEASE ORAL at 16:55

## 2018-04-04 RX ADMIN — MORPHINE SULFATE 1 MILLIGRAM(S): 50 CAPSULE, EXTENDED RELEASE ORAL at 09:15

## 2018-04-04 NOTE — CHART NOTE - NSCHARTNOTEFT_GEN_A_CORE
Pronounced Mrs. Miriam Sahni dead. Patient examined, unresponsive to verbal or tactile stimuli, no spontaneous respirations, heart sounds not audible, pulses absent, pupils fixed and dilated. Pt pronounced at 11:15.  Pt's 3 daughters and son at bedside. No autopsy at per family. Dr. Tinajero made aware.

## 2018-04-04 NOTE — H&P ADULT - NSHPREVIEWOFSYSTEMS_GEN_ALL_CORE
REVIEW OF SYSTEMS:  Constitutional: [ ] negative [ ] fevers [ ] chills [ ] weight loss [ ] weight gain [x] weakness  HEENT: [x ] negative [ ] dry eyes [ ] eye irritation [ ] postnasal drip [ ] nasal congestion  CV: [x ] negative  [ ] chest pain [ ] orthopnea [ ] palpitations [ ] murmur  Resp: [x ] negative [ ] cough [ ] shortness of breath [ ] dyspnea [ ] wheezing [ ] sputum [ ] hemoptysis  GI: [x ] negative [ ] nausea [ ] vomiting [ ] diarrhea [ ] constipation [ ] abd pain [ ] dysphagia   : [x ] negative [ ] dysuria [ ] nocturia [ ] hematuria [ ] increased urinary frequency  Musculoskeletal: [x ] negative [ ] back pain [ ] myalgias [ ] arthralgias [ ] fracture  Skin: [x ] negative [ ] rash [ ] itch  Neurological: [x ] negative [ ] headache [ ] dizziness [ ] syncope [ ] weakness [ ] numbness  Psychiatric: [ ] negative [ ] anxiety [x ] depression  [ ] All other systems negative  [ ] Unable to assess ROS because ________

## 2018-04-04 NOTE — DISCHARGE NOTE FOR THE EXPIRED PATIENT - HOSPITAL COURSE
This is a 87yr old female with PMHx of HTN, T2DM  frequent PVCs, breast cancer presenting with increased weakness, decreased appetite found to be in Afib with rvr in setting of sepsis/cardiogenic 2/2 possible RLL pna vs HF. POCUS at bedside shows right sided pleural effusion and severely decreased LV systolic function. Pt was on increasing pressors and milrinone. Patient/ family did not want any aggressive measures and made her comfort care on morphine drip. Pt  with family at bedside @ 11:15pm. Attending Dr Tinajero made aware. This is a 87yr old female with PMHx of HTN, T2DM  frequent PVCs, breast cancer presenting with increased weakness, decreased appetite found to be in Afib with rvr in setting of sepsis/cardiogenic shock 2/2 possible RLL pna vs HF. POCUS at bedside shows right sided pleural effusion and severely decreased LV systolic function. Pt was on increasing pressors and milrinone. Patient/ family did not want any aggressive measures and made her comfort care on morphine drip. Pt  with family at bedside @ 11:15pm. Attending Dr Tinajero made aware.

## 2018-04-04 NOTE — PROGRESS NOTE ADULT - ASSESSMENT
87F h/o HTN, T2DM  frequent PVCs, breast cancer  presenting with increased weakness, decreased appetite x4 days found to be in afib with rvr in setting of sepsis 2/2 possible RLL pna vs inner thigh cellulitis. POCUS R sided pleural effusion amd hyperdynamic LV systolic function.     #Neuro  - AOx3. Morphine 1mg for discomfort.     #Resp  - C/w BIPAP, Sat goal of 90%.   - R sided pleural effusion, may need thoracentesis.     #CV  - Afib with RVR, given amiodarone in the ED.   - POCUS VTI 6, likely stun myocardium likely due to demand ischemia in the setting of sepsis.   -   - C/w telemetry   - DC phenylephrine. C/w Milrinone and Levophed. MAP > 65.   - Repeat EKG  - Troponin neg, trend CE   - Holding home anti-HTN.     #GI  - NPO, except for meds for now.  - Diet once off bipap.     #ID  - septic shock- on pressors.   -pan-culture- check RVP, UA/urine culture, f/u blood culture   -c/w vanc, zosyn   -check procalcitonin      #Renal  - DEANNA on CKD, likely prerenal in setting dec PO intake, vs ATN in setting of sepsis    - c/w IVF resuscitation   -trend Cr, renally dose meds   - trend I/Os and monitor UOP     #Heme  - Leukocytosis present.   - Continue to monitor on cbc.     #Endo  - T2DM , c/w SSI   - q6h FS    #Skin  -nystatin powder to b/l inner thighs (intertrigo), start PO diflucan, wound care consult     #DVT PPx  - DVT prophylaxis - on heparin drip.     #GOC  - Patient is DNR/DNI. Patient family aware of her severity. 87F h/o HTN, T2DM  frequent PVCs, breast cancer  presenting with increased weakness, decreased appetite x4 days found to be in afib with rvr in setting of sepsis 2/2 possible RLL pna vs inner thigh cellulitis. POCUS R sided pleural effusion amd decreased LV systolic function.     #Neuro  - AOx3. Morphine 1mg for discomfort.     #Resp  - C/w BIPAP, Sat goal of 90%.   - R sided pleural effusion, may need thoracentesis.     #CV  - Afib with RVR, given amiodarone in the ED.   - POCUS VTI 6, likely stun myocardium likely due to demand ischemia in the setting of sepsis.   - C/w telemetry   - DC phenylephrine. C/w Milrinone and Levophed. MAP > 65.   - Repeat EKG  - Troponin neg, trend CE   - Holding home anti-HTN.     #GI  - NPO, except for meds for now.  - Diet once off bipap.     #ID  - septic shock- on pressors.   -pan-culture- check RVP, UA/urine culture, f/u blood culture   -c/w vanc, zosyn   -check procalcitonin      #Renal  - DEANNA on CKD, likely prerenal in setting dec PO intake, vs ATN in setting of sepsis    - c/w IVF resuscitation   -trend Cr, renally dose meds   - trend I/Os and monitor UOP     #Heme  - Leukocytosis present.   - Continue to monitor on cbc.     #Endo  - T2DM , c/w SSI   - q6h FS    #Skin  -nystatin powder to b/l inner thighs (intertrigo), start PO diflucan, wound care consult     #DVT PPx  - DVT prophylaxis - on heparin drip.     #GOC  - Patient is DNR/DNI. Patient family aware of her severity. 87F h/o HTN, T2DM  frequent PVCs, breast cancer  presenting with increased weakness, decreased appetite x4 days found to be in Afib with rvr in setting of sepsis 2/2 possible RLL pna vs inner thigh cellulitis. POCUS R sided pleural effusion amd decreased LV systolic function. More likely cardiogenic shock than septic shock.     #Neuro  - AOx3. Morphine 1mg for discomfort.     #Resp  - C/w BIPAP, Sat goal of 90%.   - R sided pleural effusion, may need thoracentesis.     #CV  - Afib with RVR, given amiodarone in the ED.   - POCUS VTI 6, likely stun myocardium likely due to demand ischemia in the setting of sepsis.   - C/w telemetry   - DC phenylephrine. C/w Milrinone and Levophed. MAP > 65.   - Repeat EKG showed no ST segment elevation.   - Troponin neg x2, one more set of CE.   - Holding home anti-HTN.   - Spoke outpatient cardiologist Dr. Hutson, last echo 3 years ago, normal LV function.   - Will order TTE if improving.     #GI  - NPO, except for meds for now.  - Diet once off bipap.     #ID  - pan-culture- check RVP, UA/urine culture, f/u blood culture   - c/w vanc, zosyn, check vanc level.   - check procalcitonin      #Renal  - DEANNA on CKD, likely prerenal in setting dec PO intake, vs ATN in setting of sepsis    - c/w IVF resuscitation   -trend Cr, renally dose meds   - trend I/Os and monitor UOP     #Heme  - Leukocytosis present.   - Continue to monitor on cbc.     #Endo  - T2DM , c/w SSI   - q6h FS    #Skin  -nystatin powder to b/l inner thighs (intertrigo), start PO diflucan, wound care consult     #DVT PPx  - DVT prophylaxis - on heparin drip.     #GOC  - Patient is DNR/DNI. Patient family aware of her severity.

## 2018-04-04 NOTE — H&P ADULT - ATTENDING COMMENTS
Patient received 2-3 Li IV fluid and Amiodarone bolus for A.Fib with RVR, septic shock, PNA, Cellulitis and UTI related etiology superimposed upon unknown CHF status in ED.   - Admit to ICU for pressor support, continue close cardiopulmonary monitoring, resuscitation and hemodynamic support  - Empiric ABx coverage for PNA, UTI and Candida rashes and cellulitis   - Amiodarone gtt protocol and obtain medical record of CHF and PVCs status   - Renal failure / ATN with high Gap metabolic acidosis and lactic acidosis follow up   - DVT / GI prophylaxix     Critical Care Time = 45 Min Patient received 2-3 Li IV fluid and Amiodarone bolus for A.Fib with RVR, septic shock, PNA, Cellulitis Rt Breast Mass and UTI related etiology superimposed upon unknown CHF status in ED.   - Admit to ICU for pressor support, continue close cardiopulmonary monitoring, resuscitation and hemodynamic support  - Empiric ABx coverage for PNA, UTI and Candida rashes and Rt breast mass with cellulitis R/O malignancy   - Amiodarone gtt protocol and obtain medical record of CHF and PVCs status   - Renal failure / ATN with high Gap metabolic acidosis and lactic acidosis follow up   - DVT / GI prophylax     Critical Care Time = 45 Min

## 2018-04-04 NOTE — H&P ADULT - NSHPLABSRESULTS_GEN_ALL_CORE
LABS:                        15.7   13.3  )-----------( 261      ( 03 Apr 2018 20:11 )             47.9     Hgb Trend: 15.7<--  04-03    141  |  95<L>  |  68<H>  ----------------------------<  109<H>  4.3   |  19<L>  |  1.62<H>    Ca    9.9      03 Apr 2018 20:20    TPro  8.5<H>  /  Alb  4.0  /  TBili  1.3<H>  /  DBili  x   /  AST  40  /  ALT  28  /  AlkPhos  176<H>  04-03    Creatinine Trend: 1.62<--        Venous Blood Gas:  04-03 @ 20:11  7.23/53/24/21/22  VBG Lactate: 8.5    BNP-[id number]       MICROBIOLOGY: blood cultures sent, UA pending     RADIOLOGY: CXR with RLL effusion     EKG: afib w/ rvr

## 2018-04-04 NOTE — CHART NOTE - NSCHARTNOTEFT_GEN_A_CORE
As per family discussion, agreed to pursue comfort measures. Discontinue all drips and medications, and lab draws. Started on morphine drip, ordered ativan for agitation.     Peyman Greer  PGY-1 Resident Physician   Pager [phone] or [phone] from 7am to 7pm, after 7pm page night float

## 2018-04-04 NOTE — PROGRESS NOTE ADULT - ATTENDING COMMENTS
Acute hypoxemic respiratory failure due to acute systolic heart failure. Pt with severely decreased LVF on bedside echocardiogram. Now on Milrinone and levophed with marginal BP. Phenylephrine  D/Ricardo. Pt in rapid afib now in 120's. Amiodarone drip d/ricardo due to hypotension. Pt was in 's before.    After family discussion, pt will be made comfort care. Pt has received morphine prn.    cc time 35 min

## 2018-04-04 NOTE — H&P ADULT - NSHPPHYSICALEXAM_GEN_ALL_CORE
OBJECTIVE:  ICU Vital Signs Last 24 Hrs  T(C): 36.3 (03 Apr 2018 20:10), Max: 36.6 (03 Apr 2018 18:57)  T(F): 97.3 (03 Apr 2018 20:10), Max: 97.8 (03 Apr 2018 18:57)  HR: 154 (03 Apr 2018 21:03) (80 - 156)  BP: 111/67 (03 Apr 2018 21:03) (98/73 - 132/82)  BP(mean): --  ABP: --  ABP(mean): --  RR: 24 (03 Apr 2018 21:03) (18 - 24)  SpO2: 99% (03 Apr 2018 21:03) (95% - 99%)      PHYSICAL EXAM:  General: NAD, AAOx3  HEENT: MMM, EOMI, PERRL, sclera anicteric   Neck: supple  Respiratory: dec lung sounds on right   Cardiovascular:  irregularly irregular   Abdomen: soft, NT/ND,   Extremities:  +2 LE edema b/l   Skin: b/l skin erythema inner thigh skin folds   Neurological: non-focal  Psychiatry: nl mood and affect

## 2018-04-04 NOTE — PROGRESS NOTE ADULT - SUBJECTIVE AND OBJECTIVE BOX
PATIENT: MURALI KIM     CHIEF COMPLAINT:  Patient is a 87y old  Female who presents with a chief complaint of inc weakness (2018 00:15)    OVERNIGHT EVENTS:  Patient was started on Phenylephrine for pressor support. Patient placed on bipap for     SUBJECTIVE: Patient seen and examined at bedside.     REVIEW OF SYSTEM:  Unable to assess due to patient's respiratory distress.     OBJECTIVE:  VITAL SIGNS:  ICU Vital Signs Last 24 Hrs  T(C): 35 (2018 07:00), Max: 36.7 (2018 04:00)  T(F): 95 (2018 07:00), Max: 98.1 (2018 04:00)  HR: 121 (2018 08:35) (80 - 162)  BP: 73/56 (2018 08:00) (73/56 - 132/82)  BP(mean): 61 (2018 08:00) (61 - 81)  RR: 30 (2018 08:35) (10 - 41)  SpO2: 86% (2018 08:35) (71% - 100%)     @ 07:  -   @ 07:00  --------------------------------------------------------  IN: 266.6 mL / OUT: 175 mL / NET: 91.6 mL     @ 07:  -   @ 08:37  --------------------------------------------------------  IN: 147.7 mL / OUT: 10 mL / NET: 137.7 mL    CAPILLARY BLOOD GLUCOSE  137 (2018 08:00)  POCT Blood Glucose.: 137 mg/dL (2018 08:00)    I/O SUMMARY:    18 @ 07:01  -  18 @ 07:00  --------------------------------------------------------  IN: 266.6 mL / OUT: 175 mL / NET: 91.6 mL    18 @ 07:01  -  18 @ 08:37  --------------------------------------------------------  IN: 147.7 mL / OUT: 10 mL / NET: 137.7 mL    PHYSICAL EXAM:  General: Respiratory distress   HEENT: NC/AT; PERRL, clear conjunctiva  Neck: supple  Respiratory: Decreased breath sound on the Right side.   Cardiovascular: Irregular tachycardic,  +S1/S2;   Abdomen: soft, NT/ND; +BS x4  Extremities: 2+ peripheral pulses b/l; no LE edema  Skin: Hyperpigmented skin in both LE.   Neurological: no focal deficits.     MEDICATIONS:  MEDICATIONS  (STANDING):  amiodarone Infusion 1 mG/Min (33.333 mL/Hr) IV Continuous <Continuous>  atorvastatin 40 milliGRAM(s) Oral at bedtime  fluconAZOLE   Tablet 100 milliGRAM(s) Oral daily  heparin  Infusion.  Unit(s)/Hr (16 mL/Hr) IV Continuous <Continuous>  insulin lispro (HumaLOG) corrective regimen sliding scale   SubCutaneous Before meals and at bedtime  nystatin Powder 1 Application(s) Topical two times a day  phenylephrine    Infusion 1 MICROgram(s)/kG/Min (32.775 mL/Hr) IV Continuous <Continuous>  piperacillin/tazobactam IVPB. 3.375 Gram(s) IV Intermittent every 8 hours    MEDICATIONS  (PRN):  heparin  Injectable 7000 Unit(s) IV Push every 6 hours PRN For aPTT less than 40  heparin  Injectable 3500 Unit(s) IV Push every 6 hours PRN For aPTT between 40 - 57    ALLERGIES:  Allergies  No Known Allergies  Intolerances      LABS:             13.2   14.1  )-----------( 208      ( 2018 01:02 )             41.2     04-04    135  |  99  |  64<H>  ----------------------------<  163<H>  4.2   |  16<L>  |  1.50<H>    Ca    8.6      2018 01:02  Phos  3.1     -  Mg     2.4         TPro  8.5<H>  /  Alb  4.0  /  TBili  1.3<H>  /  DBili  x   /  AST  40  /  ALT  28  /  AlkPhos  176<H>      LIVER FUNCTIONS - ( 2018 20:20 )  Alb: 4.0 g/dL / Pro: 8.5 g/dL / ALK PHOS: 176 U/L / ALT: 28 U/L RC / AST: 40 U/L / GGT: x           COAGULATION STUDIES:     aPTT  24.3 sec    (18 @ 01:02)     PT     14.5 sec    (18 @ 01:02)     INR    1.33 ratio         (18 @ 01:02)   PT/INR - ( 2018 01:02 )   PT: 14.5 sec;   INR: 1.33 ratio         PTT - ( 2018 01:02 )  PTT:24.3 sec  Urinalysis Basic - ( 2018 06:46 )    Color: Yellow / Appearance: SL Turbid / S.021 / pH: x  Gluc: x / Ketone: Negative  / Bili: Negative / Urobili: Negative   Blood: x / Protein: 100 mg/dL / Nitrite: Negative   Leuk Esterase: Negative / RBC: 0-2 /HPF / WBC 3-5 /HPF   Sq Epi: x / Non Sq Epi: OCC /HPF / Bacteria: Few /HPF          POCT Blood Glucose.: 137 mg/dL (18 @ 08:00)    Urinalysis Basic - ( 2018 06:46 )    Color: Yellow / Appearance: SL Turbid / S.021 / pH: x  Gluc: x / Ketone: Negative  / Bili: Negative / Urobili: Negative   Blood: x / Protein: 100 mg/dL / Nitrite: Negative   Leuk Esterase: Negative / RBC: 0-2 /HPF / WBC 3-5 /HPF   Sq Epi: x / Non Sq Epi: OCC /HPF / Bacteria: Few /HPF        MICROBIOLOGY:      RADIOLOGY & ADDITIONAL TESTS: Reviewed. PATIENT: MURALI KIM     CHIEF COMPLAINT:  Patient is a 87y old  Female who presents with a chief complaint of inc weakness (2018 00:15)    OVERNIGHT EVENTS:  Patient was started on Phenylephrine for pressor support. Patient placed on bipap for     SUBJECTIVE: Patient seen and examined at bedside.     REVIEW OF SYSTEM:  Unable to assess due to patient's respiratory distress.     OBJECTIVE:  VITAL SIGNS:  ICU Vital Signs Last 24 Hrs  T(C): 35 (2018 07:00), Max: 36.7 (2018 04:00)  T(F): 95 (2018 07:00), Max: 98.1 (2018 04:00)  HR: 121 (2018 08:35) (80 - 162)  BP: 73/56 (2018 08:00) (73/56 - 132/82)  BP(mean): 61 (2018 08:00) (61 - 81)  RR: 30 (2018 08:35) (10 - 41)  SpO2: 86% (2018 08:35) (71% - 100%)     @ 07:  -   @ 07:00  --------------------------------------------------------  IN: 266.6 mL / OUT: 175 mL / NET: 91.6 mL     @ 07:  -   @ 08:37  --------------------------------------------------------  IN: 147.7 mL / OUT: 10 mL / NET: 137.7 mL    CAPILLARY BLOOD GLUCOSE  137 (2018 08:00)  POCT Blood Glucose.: 137 mg/dL (2018 08:00)    I/O SUMMARY:    18 @ 07:01  -  18 @ 07:00  --------------------------------------------------------  IN: 266.6 mL / OUT: 175 mL / NET: 91.6 mL    18 @ 07:01  -  18 @ 08:37  --------------------------------------------------------  IN: 147.7 mL / OUT: 10 mL / NET: 137.7 mL    PHYSICAL EXAM:  General: Respiratory distress   HEENT: NC/AT; PERRL, clear conjunctiva  Neck: supple  Respiratory: Decreased breath sound on the Right side.   Cardiovascular: Irregular tachycardic,  +S1/S2;   Abdomen: soft, NT/ND; +BS x4  Extremities: 2+ peripheral pulses b/l; no LE edema  Skin: Hyperpigmented skin in both LE.   Neurological: no focal deficits.     MEDICATIONS:  MEDICATIONS  (STANDING):  amiodarone Infusion 1 mG/Min (33.333 mL/Hr) IV Continuous <Continuous>  atorvastatin 40 milliGRAM(s) Oral at bedtime  fluconAZOLE   Tablet 100 milliGRAM(s) Oral daily  heparin  Infusion.  Unit(s)/Hr (16 mL/Hr) IV Continuous <Continuous>  insulin lispro (HumaLOG) corrective regimen sliding scale   SubCutaneous Before meals and at bedtime  nystatin Powder 1 Application(s) Topical two times a day  phenylephrine    Infusion 1 MICROgram(s)/kG/Min (32.775 mL/Hr) IV Continuous <Continuous>  piperacillin/tazobactam IVPB. 3.375 Gram(s) IV Intermittent every 8 hours    MEDICATIONS  (PRN):  heparin  Injectable 7000 Unit(s) IV Push every 6 hours PRN For aPTT less than 40  heparin  Injectable 3500 Unit(s) IV Push every 6 hours PRN For aPTT between 40 - 57    ALLERGIES:  Allergies  No Known Allergies  Intolerances      LABS:             13.2   14.1  )-----------( 208      ( 2018 01:02 )             41.2     04-04    135  |  99  |  64<H>  ----------------------------<  163<H>  4.2   |  16<L>  |  1.50<H>    Ca    8.6      2018 01:02  Phos  3.1     -  Mg     2.4         TPro  8.5<H>  /  Alb  4.0  /  TBili  1.3<H>  /  DBili  x   /  AST  40  /  ALT  28  /  AlkPhos  176<H>      LIVER FUNCTIONS - ( 2018 20:20 )  Alb: 4.0 g/dL / Pro: 8.5 g/dL / ALK PHOS: 176 U/L / ALT: 28 U/L RC / AST: 40 U/L / GGT: x           COAGULATION STUDIES:     aPTT  24.3 sec    (18 @ 01:02)     PT     14.5 sec    (18 @ 01:02)     INR    1.33 ratio         (18 @ 01:02)   PT/INR - ( 2018 01:02 )   PT: 14.5 sec;   INR: 1.33 ratio         PTT - ( 2018 01:02 )  PTT:24.3 sec  Urinalysis Basic - ( 2018 06:46 )    Color: Yellow / Appearance: SL Turbid / S.021 / pH: x  Gluc: x / Ketone: Negative  / Bili: Negative / Urobili: Negative   Blood: x / Protein: 100 mg/dL / Nitrite: Negative   Leuk Esterase: Negative / RBC: 0-2 /HPF / WBC 3-5 /HPF   Sq Epi: x / Non Sq Epi: OCC /HPF / Bacteria: Few /HPF      POCT Blood Glucose.: 137 mg/dL (18 @ 08:00)    Urinalysis Basic - ( 2018 06:46 )    Color: Yellow / Appearance: SL Turbid / S.021 / pH: x  Gluc: x / Ketone: Negative  / Bili: Negative / Urobili: Negative   Blood: x / Protein: 100 mg/dL / Nitrite: Negative   Leuk Esterase: Negative / RBC: 0-2 /HPF / WBC 3-5 /HPF   Sq Epi: x / Non Sq Epi: OCC /HPF / Bacteria: Few /HPF        MICROBIOLOGY:      RADIOLOGY & ADDITIONAL TESTS: Reviewed.

## 2018-04-04 NOTE — H&P ADULT - HISTORY OF PRESENT ILLNESS
87F h/o HTN, T2DM, frequent PVCs, presenting with increased weakness, decreased appetite x4 days. Patient has been unable to pick herself up out of bed and has been walking less due to feeling weak. States she walks with a walker at baseline. Denies fever, chills, cp, sob, cough, n/v/d, dizziness, headache, dysuria, sick contacts, recent travel. No falls, LOC. Per family, patient has started to skip meals and require more supervision at home. However, has remained AAOx3. No new neuro defecits, change in stength/sensation.    Vital Signs Last 24 Hrs  T(C): 36.3 (04-03-18 @ 20:10), Max: 36.6 (04-03-18 @ 18:57)  T(F): 97.3 (04-03-18 @ 20:10), Max: 97.8 (04-03-18 @ 18:57)  HR: 154 (04-03-18 @ 21:03) (80 - 156)  BP: 111/67 (04-03-18 @ 21:03) (98/73 - 132/82)  BP(mean): --  RR: 24 (04-03-18 @ 21:03) (18 - 24)  SpO2: 99% (04-03-18 @ 21:03) (95% - 99%)    In ED, given 2.5 L LR, vanc, zosyn and started amio load.

## 2018-04-04 NOTE — CHART NOTE - NSCHARTNOTEFT_GEN_A_CORE
: Guzman    INDICATION:    PROCEDURE:  [x] LIMITED ECHO  [x] LIMITED CHEST      FINDINGS:  Lungs were anteriorly A lined, with moderate Right effusion with pleural studing.   No consolidation  ECHO: with Severe dysfunction of the LV, Normal LV to RV size ratio.    VTI was noted to be 6, and LVOT was 1.5cm      INTERPRETATION: PT was found to have a moderate pleural effusion (likely malignant given breast mass) and cardiogenic shock.

## 2018-04-04 NOTE — H&P ADULT - ASSESSMENT
87F h/o HTN, T2DM, frequent PVCs, presenting with increased weakness, decreased appetite x4 days found to be in afib with rvr in setting of sepsis 2/2 possible RLL pna vs inner thigh cellulitis with UA pending:     #septic shock- on ran gtt  -pan-culture- check RVP, UA/urine culture, f/u blood culture   -c/w vanc, zosyn   -nystatin powder to b/l inner thighs (intertrigo), start PO diflucan, wound care consult   -wean off ran gtt as tolerated     #afib w/ rvr likely 2/2 underlying infection   - c/w amio load, cards c/s (in setting of h/o PVCs), check TTE, TFTs, monitor on tele, trend Maude    #metabolic anion gap acidosis   -s/p 2.5L LR, c/w LR IVF resucitation   -repeat lactate and trend     #DEANNA w/ unknown baseline renal function   -likely prerenal in setting dec PO intake, vs ATN in setting of sepsis   -c/w IVF resuscitation   -trend Cr, renally dose meds   -trend I/Os and monitor UOP     #T2DM   -SSI and hold metformin     #HTN  -hold meds in setting of sepsis     #elevated alk phos, total bili with no RUQ abn pain   -check RUQ US, trend labs     #dispo   -admit to [facility], GOC held with patient and family. Patient is DNR/DNI 87F h/o HTN, T2DM, frequent PVCs, presenting with increased weakness, decreased appetite x4 days found to be in afib with rvr in setting of sepsis 2/2 possible RLL pna vs inner thigh cellulitis with UA pending:     #septic shock- on ran gtt  -pan-culture- check RVP, UA/urine culture, f/u blood culture   -c/w vanc, zosyn   -nystatin powder to b/l inner thighs (intertrigo), start PO diflucan, wound care consult   -wean off ran gtt as tolerated   -check procalcitonin    #afib w/ rvr likely 2/2 underlying infection   - c/w amio load, cards c/s (in setting of h/o PVCs), check TTE, TFTs, monitor on tele, trend Maude    #metabolic anion gap acidosis   -s/p 2.5L LR, c/w LR IVF resucitation   -repeat lactate and trend     #DEANNA w/ unknown baseline renal function   -likely prerenal in setting dec PO intake, vs ATN in setting of sepsis   -c/w IVF resuscitation   -trend Cr, renally dose meds   -trend I/Os and monitor UOP     #T2DM   -SSI and hold metformin     #HTN  -hold meds in setting of sepsis     #elevated alk phos, total bili with no RUQ abn pain   -check RUQ US, trend labs     #dispo   -admit to [facility], GOC held with patient and family. Patient is DNR/DNI 87F h/o HTN, T2DM, frequent PVCs, presenting with increased weakness, decreased appetite x4 days found to be in afib with rvr in setting of sepsis 2/2 possible RLL pna vs inner thigh cellulitis with UA pending:     #septic shock- on ran gtt  -pan-culture- check RVP, UA/urine culture, f/u blood culture   -c/w vanc, zosyn   -nystatin powder to b/l inner thighs (intertrigo), start PO diflucan, wound care consult   -wean off ran gtt as tolerated   -check procalcitonin    #afib w/ rvr likely 2/2 underlying infection   - c/w amio load, check TTE, TFTs, monitor on tele, trend Maude    #metabolic anion gap acidosis   -s/p 2.5L LR, c/w LR IVF resucitation   -repeat lactate and trend     #DEANNA w/ unknown baseline renal function   -likely prerenal in setting dec PO intake, vs ATN in setting of sepsis   -c/w IVF resuscitation   -trend Cr, renally dose meds   -trend I/Os and monitor UOP     #T2DM   -SSI and hold metformin     #HTN  -hold HTN meds in setting of sepsis     #elevated alk phos, total bili with no RUQ abn pain   -check RUQ US, trend labs     #dispo   -admit to [facility], GOC held with patient and family. Patient is DNR/DNI

## 2018-04-05 LAB
-  COAGULASE NEGATIVE STAPHYLOCOCCUS: SIGNIFICANT CHANGE UP
CULTURE RESULTS: NO GROWTH — SIGNIFICANT CHANGE UP
CULTURE RESULTS: SIGNIFICANT CHANGE UP
GRAM STN FLD: SIGNIFICANT CHANGE UP
METHOD TYPE: SIGNIFICANT CHANGE UP
ORGANISM # SPEC MICROSCOPIC CNT: SIGNIFICANT CHANGE UP
ORGANISM # SPEC MICROSCOPIC CNT: SIGNIFICANT CHANGE UP
SPECIMEN SOURCE: SIGNIFICANT CHANGE UP
SPECIMEN SOURCE: SIGNIFICANT CHANGE UP

## 2018-04-08 LAB
CULTURE RESULTS: SIGNIFICANT CHANGE UP
SPECIMEN SOURCE: SIGNIFICANT CHANGE UP
